# Patient Record
Sex: FEMALE | Race: WHITE | NOT HISPANIC OR LATINO | URBAN - METROPOLITAN AREA
[De-identification: names, ages, dates, MRNs, and addresses within clinical notes are randomized per-mention and may not be internally consistent; named-entity substitution may affect disease eponyms.]

---

## 2017-09-21 ENCOUNTER — INPATIENT (INPATIENT)
Facility: HOSPITAL | Age: 42
LOS: 3 days | Discharge: HOME | End: 2017-09-25
Attending: INTERNAL MEDICINE | Admitting: INTERNAL MEDICINE
Payer: COMMERCIAL

## 2017-09-21 VITALS — WEIGHT: 199.96 LBS | HEIGHT: 69 IN

## 2017-09-21 DIAGNOSIS — Z90.12 ACQUIRED ABSENCE OF LEFT BREAST AND NIPPLE: Chronic | ICD-10-CM

## 2017-09-21 LAB
ALBUMIN SERPL ELPH-MCNC: 3.8 G/DL — SIGNIFICANT CHANGE UP (ref 3.3–5)
ALP SERPL-CCNC: 177 U/L — HIGH (ref 40–120)
ALT FLD-CCNC: 195 U/L — HIGH (ref 12–78)
ANION GAP SERPL CALC-SCNC: 8 MMOL/L — SIGNIFICANT CHANGE UP (ref 5–17)
AST SERPL-CCNC: 186 U/L — HIGH (ref 15–37)
BASOPHILS # BLD AUTO: 0.1 K/UL — SIGNIFICANT CHANGE UP (ref 0–0.2)
BASOPHILS NFR BLD AUTO: 0.8 % — SIGNIFICANT CHANGE UP (ref 0–2)
BILIRUB SERPL-MCNC: 0.3 MG/DL — SIGNIFICANT CHANGE UP (ref 0.2–1.2)
BUN SERPL-MCNC: 14 MG/DL — SIGNIFICANT CHANGE UP (ref 7–23)
CALCIUM SERPL-MCNC: 9.3 MG/DL — SIGNIFICANT CHANGE UP (ref 8.5–10.1)
CHLORIDE SERPL-SCNC: 105 MMOL/L — SIGNIFICANT CHANGE UP (ref 96–108)
CO2 SERPL-SCNC: 26 MMOL/L — SIGNIFICANT CHANGE UP (ref 22–31)
CREAT SERPL-MCNC: 0.84 MG/DL — SIGNIFICANT CHANGE UP (ref 0.5–1.3)
EOSINOPHIL # BLD AUTO: 0.3 K/UL — SIGNIFICANT CHANGE UP (ref 0–0.5)
EOSINOPHIL NFR BLD AUTO: 3.5 % — SIGNIFICANT CHANGE UP (ref 0–6)
GLUCOSE SERPL-MCNC: 102 MG/DL — HIGH (ref 70–99)
HCT VFR BLD CALC: 34.6 % — SIGNIFICANT CHANGE UP (ref 34.5–45)
HGB BLD-MCNC: 12 G/DL — SIGNIFICANT CHANGE UP (ref 11.5–15.5)
LACTATE SERPL-SCNC: 1.7 MMOL/L — SIGNIFICANT CHANGE UP (ref 0.7–2)
LYMPHOCYTES # BLD AUTO: 2.2 K/UL — SIGNIFICANT CHANGE UP (ref 1–3.3)
LYMPHOCYTES # BLD AUTO: 22.8 % — SIGNIFICANT CHANGE UP (ref 13–44)
MCHC RBC-ENTMCNC: 29.3 PG — SIGNIFICANT CHANGE UP (ref 27–34)
MCHC RBC-ENTMCNC: 34.6 GM/DL — SIGNIFICANT CHANGE UP (ref 32–36)
MCV RBC AUTO: 84.6 FL — SIGNIFICANT CHANGE UP (ref 80–100)
MONOCYTES # BLD AUTO: 0.7 K/UL — SIGNIFICANT CHANGE UP (ref 0–0.9)
MONOCYTES NFR BLD AUTO: 6.9 % — SIGNIFICANT CHANGE UP (ref 2–14)
NEUTROPHILS # BLD AUTO: 6.4 K/UL — SIGNIFICANT CHANGE UP (ref 1.8–7.4)
NEUTROPHILS NFR BLD AUTO: 65.9 % — SIGNIFICANT CHANGE UP (ref 43–77)
PLATELET # BLD AUTO: 311 K/UL — SIGNIFICANT CHANGE UP (ref 150–400)
POTASSIUM SERPL-MCNC: 3.7 MMOL/L — SIGNIFICANT CHANGE UP (ref 3.5–5.3)
POTASSIUM SERPL-SCNC: 3.7 MMOL/L — SIGNIFICANT CHANGE UP (ref 3.5–5.3)
PROT SERPL-MCNC: 7.8 GM/DL — SIGNIFICANT CHANGE UP (ref 6–8.3)
RBC # BLD: 4.09 M/UL — SIGNIFICANT CHANGE UP (ref 3.8–5.2)
RBC # FLD: 12.2 % — SIGNIFICANT CHANGE UP (ref 10.3–14.5)
SODIUM SERPL-SCNC: 139 MMOL/L — SIGNIFICANT CHANGE UP (ref 135–145)
WBC # BLD: 9.8 K/UL — SIGNIFICANT CHANGE UP (ref 3.8–10.5)
WBC # FLD AUTO: 9.8 K/UL — SIGNIFICANT CHANGE UP (ref 3.8–10.5)

## 2017-09-21 PROCEDURE — 99285 EMERGENCY DEPT VISIT HI MDM: CPT

## 2017-09-21 PROCEDURE — 76700 US EXAM ABDOM COMPLETE: CPT | Mod: 26

## 2017-09-21 PROCEDURE — 73120 X-RAY EXAM OF HAND: CPT | Mod: 26,RT

## 2017-09-21 RX ORDER — CEFTRIAXONE 500 MG/1
1 INJECTION, POWDER, FOR SOLUTION INTRAMUSCULAR; INTRAVENOUS EVERY 24 HOURS
Qty: 0 | Refills: 0 | Status: DISCONTINUED | OUTPATIENT
Start: 2017-09-22 | End: 2017-09-25

## 2017-09-21 RX ORDER — ACETAMINOPHEN 500 MG
650 TABLET ORAL EVERY 6 HOURS
Qty: 0 | Refills: 0 | Status: DISCONTINUED | OUTPATIENT
Start: 2017-09-21 | End: 2017-09-23

## 2017-09-21 RX ORDER — SODIUM CHLORIDE 9 MG/ML
1000 INJECTION INTRAMUSCULAR; INTRAVENOUS; SUBCUTANEOUS ONCE
Qty: 0 | Refills: 0 | Status: COMPLETED | OUTPATIENT
Start: 2017-09-21 | End: 2017-09-21

## 2017-09-21 RX ORDER — LANOLIN ALCOHOL/MO/W.PET/CERES
3 CREAM (GRAM) TOPICAL AT BEDTIME
Qty: 0 | Refills: 0 | Status: DISCONTINUED | OUTPATIENT
Start: 2017-09-21 | End: 2017-09-25

## 2017-09-21 RX ORDER — LANOLIN ALCOHOL/MO/W.PET/CERES
3 CREAM (GRAM) TOPICAL AT BEDTIME
Qty: 0 | Refills: 0 | Status: DISCONTINUED | OUTPATIENT
Start: 2017-09-21 | End: 2017-09-21

## 2017-09-21 RX ORDER — CEFTRIAXONE 500 MG/1
INJECTION, POWDER, FOR SOLUTION INTRAMUSCULAR; INTRAVENOUS
Qty: 0 | Refills: 0 | Status: DISCONTINUED | OUTPATIENT
Start: 2017-09-21 | End: 2017-09-25

## 2017-09-21 RX ORDER — CHOLECALCIFEROL (VITAMIN D3) 125 MCG
4000 CAPSULE ORAL DAILY
Qty: 0 | Refills: 0 | Status: DISCONTINUED | OUTPATIENT
Start: 2017-09-21 | End: 2017-09-25

## 2017-09-21 RX ORDER — VENLAFAXINE HCL 75 MG
150 CAPSULE, EXT RELEASE 24 HR ORAL AT BEDTIME
Qty: 0 | Refills: 0 | Status: DISCONTINUED | OUTPATIENT
Start: 2017-09-21 | End: 2017-09-25

## 2017-09-21 RX ORDER — PIPERACILLIN AND TAZOBACTAM 4; .5 G/20ML; G/20ML
3.38 INJECTION, POWDER, LYOPHILIZED, FOR SOLUTION INTRAVENOUS ONCE
Qty: 0 | Refills: 0 | Status: COMPLETED | OUTPATIENT
Start: 2017-09-21 | End: 2017-09-21

## 2017-09-21 RX ORDER — LETROZOLE 2.5 MG/1
2.5 TABLET, FILM COATED ORAL DAILY
Qty: 0 | Refills: 0 | Status: DISCONTINUED | OUTPATIENT
Start: 2017-09-21 | End: 2017-09-25

## 2017-09-21 RX ORDER — CEFTRIAXONE 500 MG/1
1 INJECTION, POWDER, FOR SOLUTION INTRAMUSCULAR; INTRAVENOUS ONCE
Qty: 0 | Refills: 0 | Status: COMPLETED | OUTPATIENT
Start: 2017-09-21 | End: 2017-09-21

## 2017-09-21 RX ADMIN — PIPERACILLIN AND TAZOBACTAM 200 GRAM(S): 4; .5 INJECTION, POWDER, LYOPHILIZED, FOR SOLUTION INTRAVENOUS at 21:15

## 2017-09-21 RX ADMIN — CEFTRIAXONE 100 GRAM(S): 500 INJECTION, POWDER, FOR SOLUTION INTRAMUSCULAR; INTRAVENOUS at 23:11

## 2017-09-21 RX ADMIN — Medication 1 TABLET(S): at 21:15

## 2017-09-21 RX ADMIN — Medication 650 MILLIGRAM(S): at 23:55

## 2017-09-21 RX ADMIN — SODIUM CHLORIDE 1000 MILLILITER(S): 9 INJECTION INTRAMUSCULAR; INTRAVENOUS; SUBCUTANEOUS at 21:15

## 2017-09-21 NOTE — ED PROVIDER NOTE - SKIN, MLM
soft tissue edema with erythema and warm to the dorsum of hand with induration over the dorsum of 1st metacarpal, mild lymphangitis

## 2017-09-21 NOTE — H&P ADULT - ASSESSMENT
40 yo female with PMH of breast cancer s/p b/l mastectomy presents to ED with complaint of right dorsal hand cellulitis. Pt states she was bitten by a mosquito and then noted to have erythema and swelling of hand 1 day ago. She went to urgent care yesterday and was given PO clindamycin. She then noted to have increased swelling and streaking up the arm and returnd to urgent care this morning where she received a shot of penicillin IM. She then noted to have worsening of swelling and came to ED for further care. No fevers or chills. She does have a history of cellulitis around right breast after mastectomy in the past. Denies any chest pain, SOB, abd pain, N/V, diarrhea.     *Right dorsum hand cellulitis  - admit to med surg  - failed outpatient management  - given zosyn in ED and PO bactrim  - IV abx - ceftriaxone and bactrim (pt with allergy to vanco)  - f/u cultures  - ID consult    *Elevated LFTs  - check RUQ sono  - hepatitis panel    *DVT prophylaxis  - low risk, encourage ambulation 40 yo female with PMH of breast cancer s/p b/l mastectomy presents to ED with complaint of right dorsal hand cellulitis. Pt states she was bitten by a mosquito and then noted to have erythema and swelling of hand 1 day ago. She went to urgent care yesterday and was given PO clindamycin. She then noted to have increased swelling and streaking up the arm and returnd to urgent care this morning where she received a shot of penicillin IM. She then noted to have worsening of swelling and came to ED for further care. No fevers or chills. She does have a history of cellulitis around right breast after mastectomy in the past. Denies any chest pain, SOB, abd pain, N/V, diarrhea.     *Right dorsum hand cellulitis  - admit to med surg  - failed outpatient management  - given zosyn in ED and PO bactrim  - IV abx - ceftriaxone and doxycycline (pt with allergy to vanco)  - f/u cultures  - ID consult    *Elevated LFTs  - check RUQ sono  - hepatitis panel    *DVT prophylaxis  - low risk, encourage ambulation

## 2017-09-21 NOTE — ED ADULT NURSE NOTE - OBJECTIVE STATEMENT
Patient arrived to ED c/o right hand pain, redness. Patient seen at urgent care, referred to ED for cellulitis of right hand. Per patient redness got worse today, including drainage. Urgent care Patient arrived to ED c/o right hand pain, redness. Patient seen at urgent care, referred to ED for cellulitis of right hand. Per patient redness got worse today, including drainage. Urgent care gave penicillin, 1 shot and prescribed doxyclicline, 2nd dose. Hx of breast ca, pink band to left arm. Area edematous, warm to touch, not currently draining.

## 2017-09-21 NOTE — ED PROVIDER NOTE - OBJECTIVE STATEMENT
40 yo F with history of breast cancer presents to ED for evaluation of infection to the right hand. patient reports redness to hand yesterday, visited urgent care center and given an IM penicillin injection, prescription for clindamycin. patient states since yesterday infection worsening by redness passing demarcated area as well as edema to the hand. no fever or chills. denies bite by animal, thinks infection started after mosquito bite.

## 2017-09-21 NOTE — H&P ADULT - NSHPPHYSICALEXAM_GEN_ALL_CORE
Vital Signs Last 24 Hrs  T(C): 36.9 (21 Sep 2017 20:19), Max: 36.9 (21 Sep 2017 20:19)  T(F): 98.4 (21 Sep 2017 20:19), Max: 98.4 (21 Sep 2017 20:19)  HR: 100 (21 Sep 2017 20:19) (100 - 100)  BP: 113/52 (21 Sep 2017 20:19) (113/52 - 113/52)  BP(mean): 67 (21 Sep 2017 20:19) (67 - 67)  RR: 18 (21 Sep 2017 20:19) (18 - 18)  SpO2: 100% (21 Sep 2017 20:19) (100% - 100%)

## 2017-09-21 NOTE — ED STATDOCS - FAMILY HISTORY
Mother  Still living? Unknown  Family history of asthma, Age at diagnosis: Age Unknown     Father  Still living? Unknown  Family history of diabetes mellitus, Age at diagnosis: Age Unknown

## 2017-09-21 NOTE — H&P ADULT - HISTORY OF PRESENT ILLNESS
40 yo female with PMH of breast cancer s/p b/l mastectomy presents to ED with complaint of right dorsal hand cellulitis. Pt states she was bitten by a mosquito and then noted to have erythema and swelling of hand 1 day ago. She went to urgent care yesterday and was given PO clindamycin. She then noted to have increased swelling and streaking up the arm and returnd to urgent care this morning where she received a shot of penicillin IM. She then noted to have worsening of swelling and came to ED for further care. No fevers or chills. She does have a history of cellulitis around right breast after mastectomy in the past. Denies any chest pain, SOB, abd pain, N/V, diarrhea.     In ED pt given PO bactrim and IV zosyn.

## 2017-09-21 NOTE — ED STATDOCS - PROGRESS NOTE DETAILS
Sonny Mora on behalf of Attending Dr. Hernandez. 40 y/o F with PMHx of breast CA s/p mastectomy presents to the ED with right arm cellulitis after being seen at [facility]gent[facility]. Pt had multiple bites from mosquitos on both arms and developed redness, pain and warmth on right hand. Dr. Patel, PMD. Right hand with significant redness with lymphangitis going up right arm found on exam. Pt will be sent to the Main ED for further evaluation.

## 2017-09-21 NOTE — H&P ADULT - ERYTHEMA LOCATION
right dorsum hand with erythem and edema, warm to touch, area of demarcation noted, small area of confluence around thumb/hand R

## 2017-09-22 LAB
ALBUMIN SERPL ELPH-MCNC: 3.2 G/DL — LOW (ref 3.3–5)
ALP SERPL-CCNC: 166 U/L — HIGH (ref 40–120)
ALT FLD-CCNC: 185 U/L — HIGH (ref 12–78)
ANION GAP SERPL CALC-SCNC: 7 MMOL/L — SIGNIFICANT CHANGE UP (ref 5–17)
AST SERPL-CCNC: 154 U/L — HIGH (ref 15–37)
BASOPHILS # BLD AUTO: 0.1 K/UL — SIGNIFICANT CHANGE UP (ref 0–0.2)
BASOPHILS NFR BLD AUTO: 0.9 % — SIGNIFICANT CHANGE UP (ref 0–2)
BILIRUB SERPL-MCNC: 0.3 MG/DL — SIGNIFICANT CHANGE UP (ref 0.2–1.2)
BUN SERPL-MCNC: 11 MG/DL — SIGNIFICANT CHANGE UP (ref 7–23)
CALCIUM SERPL-MCNC: 8.9 MG/DL — SIGNIFICANT CHANGE UP (ref 8.5–10.1)
CHLORIDE SERPL-SCNC: 109 MMOL/L — HIGH (ref 96–108)
CO2 SERPL-SCNC: 24 MMOL/L — SIGNIFICANT CHANGE UP (ref 22–31)
CREAT SERPL-MCNC: 0.7 MG/DL — SIGNIFICANT CHANGE UP (ref 0.5–1.3)
EOSINOPHIL # BLD AUTO: 0.3 K/UL — SIGNIFICANT CHANGE UP (ref 0–0.5)
EOSINOPHIL NFR BLD AUTO: 4.1 % — SIGNIFICANT CHANGE UP (ref 0–6)
GLUCOSE SERPL-MCNC: 93 MG/DL — SIGNIFICANT CHANGE UP (ref 70–99)
HAV IGM SER-ACNC: SIGNIFICANT CHANGE UP
HBV CORE IGM SER-ACNC: SIGNIFICANT CHANGE UP
HBV SURFACE AG SER-ACNC: SIGNIFICANT CHANGE UP
HCT VFR BLD CALC: 32.3 % — LOW (ref 34.5–45)
HCV AB S/CO SERPL IA: 0.1 S/CO — SIGNIFICANT CHANGE UP
HCV AB SERPL-IMP: SIGNIFICANT CHANGE UP
HGB BLD-MCNC: 11.2 G/DL — LOW (ref 11.5–15.5)
LYMPHOCYTES # BLD AUTO: 2 K/UL — SIGNIFICANT CHANGE UP (ref 1–3.3)
LYMPHOCYTES # BLD AUTO: 27.8 % — SIGNIFICANT CHANGE UP (ref 13–44)
MCHC RBC-ENTMCNC: 29.9 PG — SIGNIFICANT CHANGE UP (ref 27–34)
MCHC RBC-ENTMCNC: 34.6 GM/DL — SIGNIFICANT CHANGE UP (ref 32–36)
MCV RBC AUTO: 86.5 FL — SIGNIFICANT CHANGE UP (ref 80–100)
MONOCYTES # BLD AUTO: 0.5 K/UL — SIGNIFICANT CHANGE UP (ref 0–0.9)
MONOCYTES NFR BLD AUTO: 7.2 % — SIGNIFICANT CHANGE UP (ref 2–14)
NEUTROPHILS # BLD AUTO: 4.4 K/UL — SIGNIFICANT CHANGE UP (ref 1.8–7.4)
NEUTROPHILS NFR BLD AUTO: 60 % — SIGNIFICANT CHANGE UP (ref 43–77)
PLATELET # BLD AUTO: 234 K/UL — SIGNIFICANT CHANGE UP (ref 150–400)
POTASSIUM SERPL-MCNC: 3.9 MMOL/L — SIGNIFICANT CHANGE UP (ref 3.5–5.3)
POTASSIUM SERPL-SCNC: 3.9 MMOL/L — SIGNIFICANT CHANGE UP (ref 3.5–5.3)
PROT SERPL-MCNC: 6.9 GM/DL — SIGNIFICANT CHANGE UP (ref 6–8.3)
RBC # BLD: 3.73 M/UL — LOW (ref 3.8–5.2)
RBC # FLD: 12.9 % — SIGNIFICANT CHANGE UP (ref 10.3–14.5)
SODIUM SERPL-SCNC: 140 MMOL/L — SIGNIFICANT CHANGE UP (ref 135–145)
WBC # BLD: 7.3 K/UL — SIGNIFICANT CHANGE UP (ref 3.8–10.5)
WBC # FLD AUTO: 7.3 K/UL — SIGNIFICANT CHANGE UP (ref 3.8–10.5)

## 2017-09-22 RX ORDER — MUPIROCIN 20 MG/G
1 OINTMENT TOPICAL
Qty: 0 | Refills: 0 | Status: DISCONTINUED | OUTPATIENT
Start: 2017-09-22 | End: 2017-09-23

## 2017-09-22 RX ORDER — DAPTOMYCIN 500 MG/10ML
360 INJECTION, POWDER, LYOPHILIZED, FOR SOLUTION INTRAVENOUS EVERY 24 HOURS
Qty: 0 | Refills: 0 | Status: DISCONTINUED | OUTPATIENT
Start: 2017-09-22 | End: 2017-09-25

## 2017-09-22 RX ADMIN — Medication 110 MILLIGRAM(S): at 05:01

## 2017-09-22 RX ADMIN — CEFTRIAXONE 100 GRAM(S): 500 INJECTION, POWDER, FOR SOLUTION INTRAMUSCULAR; INTRAVENOUS at 22:03

## 2017-09-22 RX ADMIN — Medication 650 MILLIGRAM(S): at 16:34

## 2017-09-22 RX ADMIN — DAPTOMYCIN 114.4 MILLIGRAM(S): 500 INJECTION, POWDER, LYOPHILIZED, FOR SOLUTION INTRAVENOUS at 12:15

## 2017-09-22 RX ADMIN — MUPIROCIN 1 APPLICATION(S): 20 OINTMENT TOPICAL at 18:50

## 2017-09-22 RX ADMIN — LETROZOLE 2.5 MILLIGRAM(S): 2.5 TABLET, FILM COATED ORAL at 12:15

## 2017-09-22 RX ADMIN — Medication 110 MILLIGRAM(S): at 00:26

## 2017-09-22 RX ADMIN — Medication 150 MILLIGRAM(S): at 22:03

## 2017-09-22 RX ADMIN — Medication 4000 UNIT(S): at 12:15

## 2017-09-22 NOTE — PROGRESS NOTE ADULT - SUBJECTIVE AND OBJECTIVE BOX
CC: hand infection    HPI: 40 yo female with PMH of breast cancer s/p b/l mastectomy presents to ED with complaint of right dorsal hand cellulitis. Pt states she was bitten by a mosquito and then noted to have erythema and swelling of hand X 1 day not improved on Clinda given by Urgent care X 1 day. Symptoms worsened and patient was given IM PCN at Urgent Care 2nd visit and referred to ED where she was given IV Zosyn and PO Bactrim (allergy to Vanc). She does have a history of cellulitis around right breast after mastectomy in the past.    Seen by ID, on IV Rocephin and IV Dapto --> pain decreasing. Seen ambulating in hallways, denies fever.       Vital Signs Last 24 Hrs  T(C): 36.7 (22 Sep 2017 04:56), Max: 36.9 (21 Sep 2017 20:19)  T(F): 98.1 (22 Sep 2017 04:56), Max: 98.4 (21 Sep 2017 20:19)  HR: 81 (22 Sep 2017 04:56) (81 - 100)  BP: 107/62 (22 Sep 2017 04:56) (107/62 - 123/70)  BP(mean): 67 (21 Sep 2017 20:19) (67 - 67)  RR: 16 (22 Sep 2017 04:56) (16 - 18)  SpO2: 98% (22 Sep 2017 04:56) (98% - 100%)    PHYSICAL EXAM:  Constitutional: NAD, awake and alert, well-developed  HEENT: PERR, EOMI, Normal Hearing, MMM  Neck: Soft and supple, No LAD, No JVD  Respiratory: Breath sounds are clear bilaterally, No wheezing, rales or rhonchi  Cardiovascular: S1 and S2, regular rate and rhythm, no Murmurs, gallops or rubs  Gastrointestinal: Bowel Sounds present, soft, nontender, nondistended, no guarding, no rebound  Extremities: No peripheral edema  Vascular: 2+ peripheral pulses  Neurological: A/O x 3, no focal deficits  Musculoskeletal: 5/5 strength b/l upper and lower extremities  Skin: Notable swelling and erthyma surroundin base of right thumb and punctate wound. Not extending beyond marker demarcation.     MEDICATIONS  (STANDING):  cefTRIAXone   IVPB      cefTRIAXone   IVPB 1 Gram(s) IV Intermittent every 24 hours  venlafaxine XR. 150 milliGRAM(s) Oral at bedtime  letrozole 2.5 milliGRAM(s) Oral daily  cholecalciferol 4000 Unit(s) Oral daily  DAPTOmycin IVPB 360 milliGRAM(s) IV Intermittent every 24 hours      LABS: All Labs Reviewed:                        11.2   7.3   )-----------( 234      ( 22 Sep 2017 05:30 )             32.3     09-22    140  |  109<H>  |  11  ----------------------------<  93  3.9   |  24  |  0.70    Ca    8.9      22 Sep 2017 05:30    TPro  6.9  /  Alb  3.2<L>  /  TBili  0.3  /  DBili  x   /  AST  154<H>  /  ALT  185<H>  /  AlkPhos  166<H>  09-22  Blood Culture: pending     US Abdomen Complete (09.21.17 @ 22:51) >  IMPRESSION:  Hepatic steatosis. No gallstones or evidence of acute cholecystitis.

## 2017-09-22 NOTE — PROGRESS NOTE ADULT - ASSESSMENT
42 yo female with PMH of breast cancer s/p b/l mastectomy presents to ED with complaint of right dorsal hand cellulitis.    *Right dorsum hand cellulitis  - appears to be improving on IV abx --> cont IV Rocephin, Dapto added for MRSA coverage (allergic to Vanc).   - appreciate ID.   - f/u cultures.   - no extension to fingers, movement mildly limited by swelling otherwise does not need Hand Consult. Afebrile.     *Elevated LFTs - likely medication induced, on Oral Letrozole and Effexor.   - known hx of mildly elevated LFTs.   - RUQ Sono with hepatic steatosis, no cholescytitis. Asymptomatic. Reviewed diet and weight loss.   - hepatitis panel    # [place] [facility] [place], told in [place].   - cont daily Letrozole.     *DVT prophylaxis  - low risk, encourage ambulation    Dispo: remain inpatient, tentative dc home tomorrow pending clinical improvement.   Total time > 40 mins.

## 2017-09-22 NOTE — CONSULT NOTE ADULT - SUBJECTIVE AND OBJECTIVE BOX
HPI:  42 yo female with PMH of breast cancer s/p b/l mastectomy now admitted on 9/21 for evaluation of right hand ulcer over first mcp joint with surrounding redness and swelling with streaking up the wrist and lower arm; she notes being bit by a mosquito prior to this; she also has on left hand another bite that has ulcerative appearance that she states happens after bug bites; she was seen in [facility], given clindamycin then returned and given shot of penicillin but symptoms progressed and she came to ED.           PMH: as above  PSH: as above  Meds: per reconcilation sheet, noted below  MEDICATIONS  (STANDING):  cefTRIAXone   IVPB      cefTRIAXone   IVPB 1 Gram(s) IV Intermittent every 24 hours  venlafaxine XR. 150 milliGRAM(s) Oral at bedtime  letrozole 2.5 milliGRAM(s) Oral daily  cholecalciferol 4000 Unit(s) Oral daily  DAPTOmycin IVPB 360 milliGRAM(s) IV Intermittent every 24 hours    MEDICATIONS  (PRN):  acetaminophen   Tablet 650 milliGRAM(s) Oral every 6 hours PRN For Temp greater than 38 C (100.4 F)  melatonin 3 milliGRAM(s) Oral at bedtime PRN Insomnia    Allergies    Erythromycin Base (Unknown)  strawberry (Unknown)  vancomycin (Hives)    Intolerances      Social: no smoking, no alcohol, no illegal drugs; no recent travel, no exposure to TB  FAMILY HISTORY:  Family history of diabetes mellitus (Father)  Family history of asthma (Mother)    ROS: the patient has no fever, no chills, no HA, no dizziness, no sore throat, no blurry vision, no CP, no palpitations, no SOB, no cough, no abdominal pain, no diarrhea, no N/V, no dysuria, no leg pain, no claudication, no rash,  no rectal pain or bleeding, no night sweats  Vital Signs Last 24 Hrs  T(C): 36.7 (22 Sep 2017 04:56), Max: 36.9 (21 Sep 2017 20:19)  T(F): 98.1 (22 Sep 2017 04:56), Max: 98.4 (21 Sep 2017 20:19)  HR: 81 (22 Sep 2017 04:56) (81 - 100)  BP: 107/62 (22 Sep 2017 04:56) (107/62 - 123/70)  BP(mean): 67 (21 Sep 2017 20:19) (67 - 67)  RR: 16 (22 Sep 2017 04:56) (16 - 18)  SpO2: 98% (22 Sep 2017 04:56) (98% - 100%)  Daily Height in cm: 175.26 (21 Sep 2017 20:10)    Daily   Constitutional: nontoxic  HEENT: NC/AT, EOMI, PERRLA  Neck: supple  Respiratory: clear, no r/r/w  Cardiovascular: S1S2 regular, no murmurs  Abdomen: soft, not tender, not distended, positive BS  Genitourinary: deferred  Rectal: deferred  Musculoskeletal: right hand with ulcer over first mcp, surrounding erythema and edema, tender to touch, decreased range of motion, similar ulcer on left hand but less erythema  Neurological: AxOx3, moving all extremities, no focal deficits  Skin: no rashes                          11.2   7.3   )-----------( 234      ( 22 Sep 2017 05:30 )             32.3     09-22    140  |  109<H>  |  11  ----------------------------<  93  3.9   |  24  |  0.70    Ca    8.9      22 Sep 2017 05:30    TPro  6.9  /  Alb  3.2<L>  /  TBili  0.3  /  DBili  x   /  AST  154<H>  /  ALT  185<H>  /  AlkPhos  166<H>  09-22     LIVER FUNCTIONS - ( 22 Sep 2017 05:30 )  Alb: 3.2 g/dL / Pro: 6.9 gm/dL / ALK PHOS: 166 U/L / ALT: 185 U/L / AST: 154 U/L / GGT: x                 Radiology:< from: US Abdomen Complete (09.21.17 @ 22:51) >  EXAM:  US COMPLETE ABDOMEN SONOGRAM                            PROCEDURE DATE:  09/21/2017          INTERPRETATION:  HISTORY: Elevated LFTs. Right upper quadrant pain.    TECHNIQUE: Abdominal sonogram was performed.     COMPARISON: There is no similar prior study for comparison.    FINDINGS:   The study is suboptimal due to extensive bowel gas shadowing.    The liver is borderline enlarged and demonstrates diffuse increased   echogenicity compatible with hepatic steatosis. No focal liver lesionis   seen on this modality. There is no intra or extrahepatic biliary   dilatation. The common bile duct measures 5 mm. The gallbladder is fluid   filled without evidence of calculus or wall thickening. There is no   pericholecystic fluid. Sonographic Cerrato's sign was not elicited.    The spleen is upper limits of normal in size measuring 15.3 cm.   Echotexture is unremarkable. The pancreas is not well-visualized. There   is no ascites.    The upper abdominal aorta and inferior vena cava are within normal limits.    The right kidney measures 10.8 cm and the left kidney measures 10.8 cm in   the sagittal plane. There is no hydronephrosis or shadowing calculus.    IMPRESSION:  Hepatic steatosis. No gallstones or evidence of acute cholecystitis.        < end of copied text >      Advanced directive addressed: full resuscitation

## 2017-09-22 NOTE — CONSULT NOTE ADULT - ASSESSMENT
42 yo female with PMH of breast cancer s/p b/l mastectomy now admitted on 9/21 for evaluation of right hand ulcer over first mcp joint with surrounding redness and swelling with streaking up the wrist and lower arm; she notes being bit by a mosquito prior to this; she also has on left hand another bite that has ulcerative appearance that she states happens after bug bites; she was seen in [facility] [place], given clindamycin then returned and given shot of penicillin but symptoms progressed and she came to ED.   1. Right hand cellulitis with ulcer  - follow up cultures   - elevate hand  - serial cbc and monitor temperature   - agree with ceftriaxone as ordered  - will start daptomycin for resistant bacteria as patient cannot tolerate vancomycin  - iv hydration and supportive care   Will follow

## 2017-09-23 RX ORDER — DOCUSATE SODIUM 100 MG
100 CAPSULE ORAL
Qty: 0 | Refills: 0 | Status: DISCONTINUED | OUTPATIENT
Start: 2017-09-23 | End: 2017-09-25

## 2017-09-23 RX ORDER — ACETAMINOPHEN 500 MG
650 TABLET ORAL ONCE
Qty: 0 | Refills: 0 | Status: COMPLETED | OUTPATIENT
Start: 2017-09-23 | End: 2017-09-23

## 2017-09-23 RX ORDER — ONDANSETRON 8 MG/1
4 TABLET, FILM COATED ORAL EVERY 4 HOURS
Qty: 0 | Refills: 0 | Status: DISCONTINUED | OUTPATIENT
Start: 2017-09-23 | End: 2017-09-25

## 2017-09-23 RX ORDER — IBUPROFEN 200 MG
600 TABLET ORAL EVERY 6 HOURS
Qty: 0 | Refills: 0 | Status: DISCONTINUED | OUTPATIENT
Start: 2017-09-23 | End: 2017-09-25

## 2017-09-23 RX ADMIN — DAPTOMYCIN 114.4 MILLIGRAM(S): 500 INJECTION, POWDER, LYOPHILIZED, FOR SOLUTION INTRAVENOUS at 10:24

## 2017-09-23 RX ADMIN — Medication 600 MILLIGRAM(S): at 22:29

## 2017-09-23 RX ADMIN — Medication 150 MILLIGRAM(S): at 21:59

## 2017-09-23 RX ADMIN — Medication 600 MILLIGRAM(S): at 21:59

## 2017-09-23 RX ADMIN — LETROZOLE 2.5 MILLIGRAM(S): 2.5 TABLET, FILM COATED ORAL at 11:41

## 2017-09-23 RX ADMIN — Medication 600 MILLIGRAM(S): at 15:54

## 2017-09-23 RX ADMIN — Medication 650 MILLIGRAM(S): at 01:01

## 2017-09-23 RX ADMIN — Medication 650 MILLIGRAM(S): at 23:14

## 2017-09-23 RX ADMIN — Medication 3 MILLIGRAM(S): at 01:21

## 2017-09-23 RX ADMIN — Medication 3 MILLIGRAM(S): at 23:15

## 2017-09-23 RX ADMIN — MUPIROCIN 1 APPLICATION(S): 20 OINTMENT TOPICAL at 05:07

## 2017-09-23 RX ADMIN — CEFTRIAXONE 100 GRAM(S): 500 INJECTION, POWDER, FOR SOLUTION INTRAMUSCULAR; INTRAVENOUS at 21:59

## 2017-09-23 RX ADMIN — Medication 650 MILLIGRAM(S): at 23:44

## 2017-09-23 RX ADMIN — Medication 4000 UNIT(S): at 11:41

## 2017-09-23 NOTE — CONSULT NOTE ADULT - ASSESSMENT
A/P: 41F w/ R hand abscess/cellulitis   Pain control  DVT ppx - encourage ambulation  WBAT  Keep dressing CDI  Maintain penrose drain  Cont IV abx per ID  Cont med management per primary team  Drain to be removed Monday 9/25 by Ortho  Dr. Ahmadi present at bedside and agrees with above plan  All pt questions answered  FU with Dr. hAmadi on outpatient basis 2 days following DC from hospital  Dc planning

## 2017-09-23 NOTE — PROGRESS NOTE ADULT - ASSESSMENT
40 yo female with PMH of breast cancer s/p b/l mastectomy presents to ED with complaint of right dorsal hand cellulitis.    *Right dorsum hand cellulitis  - arm extension improved but now with induration on hand, possibly to benefit from I&D.   - will consult Ortho Hand for further recs.   - cont IV Rocephin and Dapto day #2. Afebrile, no leukocytosis.   - f/u cultures.   - ibuprofen ATC for swelling and pain.     *Elevated LFTs - likely medication induced, on Oral Letrozole and Effexor.   - known hx of mildly elevated LFTs.   - RUQ Sono with hepatic steatosis, no cholescytitis. Asymptomatic. Reviewed diet and weight loss.   - hepatitis panel    # Hx of Breast Cancer - [place] [facility] [place] not [place], told in remission.   - cont daily Letrozole.     *DVT prophylaxis  - low risk, encourage ambulation    Dispo: remain inpatient, tentative dc home tomorrow pending clinical improvement. Consult Ortho hand.   Total time > 40 mins.

## 2017-09-23 NOTE — CONSULT NOTE ADULT - SUBJECTIVE AND OBJECTIVE BOX
40yo RHD female c/o R hand/wrist pain and cellulitis/infection. This began on Thursday of this week. Pt states infection was localized around a recent mosquito bite. Denies trauma or associated injury. Denies nausea, vomiting, chest pain, shortness of breath, abdominal pain or fever. No new complaints. No acute motor or sensory changes are reported. Orthopedic consultation was requested by the Medical service and Pt was seen for Dr. Ahmadi.    PAST MEDICAL & SURGICAL HISTORY:  Breast cancer  H/O mastectomy, left  Chemo    MEDICATIONS  (STANDING):  cefTRIAXone   IVPB      cefTRIAXone   IVPB 1 Gram(s) IV Intermittent every 24 hours  venlafaxine XR. 150 milliGRAM(s) Oral at bedtime  letrozole 2.5 milliGRAM(s) Oral daily  cholecalciferol 4000 Unit(s) Oral daily  DAPTOmycin IVPB 360 milliGRAM(s) IV Intermittent every 24 hours    MEDICATIONS  (PRN):  melatonin 3 milliGRAM(s) Oral at bedtime PRN Insomnia  ondansetron Injectable 4 milliGRAM(s) IV Push every 4 hours PRN Nausea and/or Vomiting  docusate sodium 100 milliGRAM(s) Oral two times a day PRN Constipation  ibuprofen  Tablet 600 milliGRAM(s) Oral every 6 hours PRN Moderate Pain    Vital Signs Last 24 Hrs  T(C): 37.1 (23 Sep 2017 12:05), Max: 37.8 (23 Sep 2017 05:12)  T(F): 98.7 (23 Sep 2017 12:05), Max: 100 (23 Sep 2017 05:12)  HR: 93 (23 Sep 2017 12:05) (85 - 93)  BP: 131/74 (23 Sep 2017 12:05) (127/58 - 131/74)  RR: 18 (23 Sep 2017 12:05) (16 - 18)  SpO2: 98% (23 Sep 2017 12:05) (98% - 100%)    Labs:                        11.2   7.3   )-----------( 234      ( 22 Sep 2017 05:30 )             32.3     09-22    140  |  109<H>  |  11  ----------------------------<  93  3.9   |  24  |  0.70    Ca    8.9      22 Sep 2017 05:30    TPro  6.9  /  Alb  3.2<L>  /  TBili  0.3  /  DBili  x   /  AST  154<H>  /  ALT  185<H>  /  AlkPhos  166<H>  09-22      Physical exam:   Gen: NAD Alert Awake, Follows commands. Nonseptic appearing.    R Hand:  There is erythema of radial aspect of the right had with a localized infectious process and abscess near the CMC joint of thumb  There is mild global tenderness of the affected area. Fluctuance noted. No drainage currently but was reported   No proximal streaking or spreading is noted.   Compartments are soft.   Sensation to light touch is intact of digits distally.   Neurologically without focal deficit and strength is symmetric bilaterally.   Motion is mildly limited as a result of pain.  No focal motor weaknesses are appreciated.   2+ Radial DP **pulse.   Capillary refill brisk less than 2 seconds.   No cyanosis.    Imaging:  R Hand/Wrist: No acute fracture; no signs bony infection     Procedure:  Hand prepped with betadine  Sterile field constructed  Local 1% lidocaine with epi injected  Bedside I+D with sterile technique performed  Penrose drain inserted  Sterile dressing applied  Pt NVI post-procedure  No complications

## 2017-09-23 NOTE — PROGRESS NOTE ADULT - SUBJECTIVE AND OBJECTIVE BOX
CC: hand infection    HPI: 42 yo female with PMH of breast cancer s/p b/l mastectomy presents to ED with complaint of right dorsal hand cellulitis. Pt states she was bitten by a mosquito and then noted to have erythema and swelling of hand X 1 day not improved on Clinda given by Urgent care X 1 day. Symptoms worsened and patient was given IM PCN at Urgent Care 2nd visit and referred to ED where she was given IV Zosyn and PO Bactrim (allergy to Vanc). She does have a history of cellulitis around right breast after mastectomy in the past.    9/23/17: Arm erythema resolved however right hand with pain / redness and now pus. Inquires about I&D. Afebrile.  ROS: neg unless stated above.     Vital Signs Last 24 Hrs  T(C): 37.1 (23 Sep 2017 12:05), Max: 37.8 (23 Sep 2017 05:12)  T(F): 98.7 (23 Sep 2017 12:05), Max: 100 (23 Sep 2017 05:12)  HR: 93 (23 Sep 2017 12:05) (85 - 97)  BP: 131/74 (23 Sep 2017 12:05) (127/58 - 136/82)  BP(mean): --  RR: 18 (23 Sep 2017 12:05) (16 - 18)  SpO2: 98% (23 Sep 2017 12:05) (97% - 100%)    PHYSICAL EXAM:  Constitutional: NAD, awake and alert, well-developed female.   HEENT: PERR, EOMI, Normal Hearing, MMM  Neck: Soft and supple, No LAD, No JVD  Respiratory: Breath sounds are clear bilaterally, No wheezing, rales or rhonchi  Cardiovascular: S1 and S2, regular rate and rhythm, no Murmurs, gallops or rubs  Gastrointestinal: Bowel Sounds present, soft, nontender, nondistended, no guarding, no rebound  Extremities: No peripheral edema  Vascular: 2+ peripheral pulses  Neurological: A/O x 3, no focal deficits  Musculoskeletal: 5/5 strength b/l upper and lower extremities  Skin: Notable swelling and erthyma surrounding base of right thumb and punctate wound now draining white pus. Receeding arm erythema.    MEDICATIONS  (STANDING):  cefTRIAXone   IVPB      cefTRIAXone   IVPB 1 Gram(s) IV Intermittent every 24 hours  venlafaxine XR. 150 milliGRAM(s) Oral at bedtime  letrozole 2.5 milliGRAM(s) Oral daily  cholecalciferol 4000 Unit(s) Oral daily  DAPTOmycin IVPB 360 milliGRAM(s) IV Intermittent every 24 hours  mupirocin 2% Ointment 1 Application(s) Topical two times a day    LABS: All Labs Reviewed:                        11.2   7.3   )-----------( 234      ( 22 Sep 2017 05:30 )             32.3   09-22    140  |  109<H>  |  11  ----------------------------<  93  3.9   |  24  |  0.70    Ca    8.9      22 Sep 2017 05:30    TPro  6.9  /  Alb  3.2<L>  /  TBili  0.3  /  DBili  x   /  AST  154<H>  /  ALT  185<H>  /  AlkPhos  166<H>  09-22  Blood Culture: pending     US Abdomen Complete (09.21.17 @ 22:51) >  IMPRESSION:  Hepatic steatosis. No gallstones or evidence of acute cholecystitis.

## 2017-09-24 LAB
ANION GAP SERPL CALC-SCNC: 8 MMOL/L — SIGNIFICANT CHANGE UP (ref 5–17)
BUN SERPL-MCNC: 13 MG/DL — SIGNIFICANT CHANGE UP (ref 7–23)
CALCIUM SERPL-MCNC: 9.3 MG/DL — SIGNIFICANT CHANGE UP (ref 8.5–10.1)
CHLORIDE SERPL-SCNC: 105 MMOL/L — SIGNIFICANT CHANGE UP (ref 96–108)
CO2 SERPL-SCNC: 25 MMOL/L — SIGNIFICANT CHANGE UP (ref 22–31)
CREAT SERPL-MCNC: 0.78 MG/DL — SIGNIFICANT CHANGE UP (ref 0.5–1.3)
GLUCOSE SERPL-MCNC: 122 MG/DL — HIGH (ref 70–99)
HCT VFR BLD CALC: 37 % — SIGNIFICANT CHANGE UP (ref 34.5–45)
HGB BLD-MCNC: 12.3 G/DL — SIGNIFICANT CHANGE UP (ref 11.5–15.5)
MCHC RBC-ENTMCNC: 28.6 PG — SIGNIFICANT CHANGE UP (ref 27–34)
MCHC RBC-ENTMCNC: 33.2 GM/DL — SIGNIFICANT CHANGE UP (ref 32–36)
MCV RBC AUTO: 86.3 FL — SIGNIFICANT CHANGE UP (ref 80–100)
PLATELET # BLD AUTO: 306 K/UL — SIGNIFICANT CHANGE UP (ref 150–400)
POTASSIUM SERPL-MCNC: 4 MMOL/L — SIGNIFICANT CHANGE UP (ref 3.5–5.3)
POTASSIUM SERPL-SCNC: 4 MMOL/L — SIGNIFICANT CHANGE UP (ref 3.5–5.3)
RBC # BLD: 4.29 M/UL — SIGNIFICANT CHANGE UP (ref 3.8–5.2)
RBC # FLD: 12.4 % — SIGNIFICANT CHANGE UP (ref 10.3–14.5)
SODIUM SERPL-SCNC: 138 MMOL/L — SIGNIFICANT CHANGE UP (ref 135–145)
WBC # BLD: 5.9 K/UL — SIGNIFICANT CHANGE UP (ref 3.8–10.5)
WBC # FLD AUTO: 5.9 K/UL — SIGNIFICANT CHANGE UP (ref 3.8–10.5)

## 2017-09-24 RX ORDER — OXYCODONE AND ACETAMINOPHEN 5; 325 MG/1; MG/1
1 TABLET ORAL EVERY 4 HOURS
Qty: 0 | Refills: 0 | Status: DISCONTINUED | OUTPATIENT
Start: 2017-09-24 | End: 2017-09-25

## 2017-09-24 RX ORDER — IBUPROFEN 200 MG
1 TABLET ORAL
Qty: 0 | Refills: 0 | DISCHARGE
Start: 2017-09-24

## 2017-09-24 RX ADMIN — Medication 150 MILLIGRAM(S): at 21:34

## 2017-09-24 RX ADMIN — CEFTRIAXONE 100 GRAM(S): 500 INJECTION, POWDER, FOR SOLUTION INTRAMUSCULAR; INTRAVENOUS at 21:36

## 2017-09-24 RX ADMIN — LETROZOLE 2.5 MILLIGRAM(S): 2.5 TABLET, FILM COATED ORAL at 11:37

## 2017-09-24 RX ADMIN — Medication 600 MILLIGRAM(S): at 05:58

## 2017-09-24 RX ADMIN — ONDANSETRON 4 MILLIGRAM(S): 8 TABLET, FILM COATED ORAL at 05:28

## 2017-09-24 RX ADMIN — Medication 600 MILLIGRAM(S): at 17:27

## 2017-09-24 RX ADMIN — Medication 600 MILLIGRAM(S): at 05:28

## 2017-09-24 RX ADMIN — Medication 600 MILLIGRAM(S): at 16:57

## 2017-09-24 RX ADMIN — Medication 4000 UNIT(S): at 11:37

## 2017-09-24 RX ADMIN — DAPTOMYCIN 114.4 MILLIGRAM(S): 500 INJECTION, POWDER, LYOPHILIZED, FOR SOLUTION INTRAVENOUS at 08:34

## 2017-09-24 NOTE — PROGRESS NOTE ADULT - SUBJECTIVE AND OBJECTIVE BOX
Pt S&E. Pain controlled. No acute events overnight    Vital Signs Last 24 Hrs  T(C): 36.4 (09-24-17 @ 05:39), Max: 37.1 (09-23-17 @ 12:05)  T(F): 97.5 (09-24-17 @ 05:39), Max: 98.7 (09-23-17 @ 12:05)  HR: 78 (09-24-17 @ 05:39) (78 - 93)  BP: 141/76 (09-24-17 @ 05:39) (120/62 - 141/76)  BP(mean): --  RR: 18 (09-24-17 @ 05:39) (18 - 18)  SpO2: 97% (09-24-17 @ 05:39) (95% - 98%)    Gen: NAD  RUE:  Dsg C/D/I  SILT C5-T1  +AIN/PIN/radial/ulnar/median/musc  +radial pulse  soft compartments, - calf ttp

## 2017-09-24 NOTE — PROGRESS NOTE ADULT - ASSESSMENT
42 yo female with PMH of breast cancer s/p b/l mastectomy presents to ED with complaint of right dorsal hand cellulitis.    *Right dorsum hand cellulitis  - s/p I&D w/ pinrose drain by Ortho on 9/23 --> to be removed on 9/25.   - cont IV Rocephin and Dapto day #3. Afebrile, no leukocytosis.   - f/u cultures.   - ibuprofen ATC for swelling and pain. Add prn percocet.   - await ID recs for abx upon discharge.     *Elevated LFTs - likely medication induced, on Oral Letrozole and Effexor.   - known hx of mildly elevated LFTs.   - RUQ Sono with hepatic steatosis, no cholescytitis. Asymptomatic. Reviewed diet and weight loss.   - hepatitis panel NEGATIVE.     # Hx of Breast Cancer - [place] [facility] [place], told in remission.   - cont daily Letrozole.     *DVT prophylaxis  - low risk, encourage ambulation    Dispo: remain inpatient, tentative dc home tomorrow pending clinical improvement.   Total time > 40 mins.

## 2017-09-24 NOTE — PROGRESS NOTE ADULT - SUBJECTIVE AND OBJECTIVE BOX
CC: hand infection    HPI: 42 yo female with PMH of breast cancer s/p b/l mastectomy presents to ED with complaint of right dorsal hand cellulitis. Pt states she was bitten by a mosquito and then noted to have erythema and swelling of hand X 1 day not improved on Clinda given by Urgent care X 1 day. Symptoms worsened and patient was given IM PCN at Urgent Care 2nd visit and referred to ED where she was given IV Zosyn and PO Bactrim (allergy to Vanc). She does have a history of cellulitis around right breast after mastectomy in the past.    9/23/17: Arm erythema resolved however right hand with pain / redness and now pus. Inquires about I&D. Afebrile.    9/24/17: Afebrile, had pinrose drain placed yesterday by Ortho. Not ready for discharge. Worried to go home with drain in place.     ROS: neg unless stated above.     Vital Signs Last 24 Hrs  T(C): 36.4 (24 Sep 2017 05:39), Max: 36.4 (23 Sep 2017 20:46)  T(F): 97.5 (24 Sep 2017 05:39), Max: 97.5 (23 Sep 2017 20:46)  HR: 78 (24 Sep 2017 05:39) (78 - 93)  BP: 141/76 (24 Sep 2017 05:39) (120/62 - 141/76)  BP(mean): --  RR: 18 (24 Sep 2017 05:39) (18 - 18)  SpO2: 97% (24 Sep 2017 05:39) (95% - 97%)    PHYSICAL EXAM:  Constitutional: NAD, awake and alert, well-developed female.   HEENT: PERR, EOMI, Normal Hearing, MMM  Neck: Soft and supple, No LAD, No JVD  Respiratory: Breath sounds are clear bilaterally, No wheezing, rales or rhonchi  Cardiovascular: S1 and S2, regular rate and rhythm, no Murmurs, gallops or rubs  Gastrointestinal: Bowel Sounds present, soft, nontender, nondistended, no guarding, no rebound  Extremities: No peripheral edema  Vascular: 2+ peripheral pulses  Neurological: A/O x 3, no focal deficits  Musculoskeletal: 5/5 strength b/l upper and lower extremities  Skin: Right hand with dressing, pinrose drain placed. Receding arm erythema. Left hand with minimal erythema.     MEDICATIONS  (STANDING):  cefTRIAXone   IVPB      cefTRIAXone   IVPB 1 Gram(s) IV Intermittent every 24 hours  venlafaxine XR. 150 milliGRAM(s) Oral at bedtime  letrozole 2.5 milliGRAM(s) Oral daily  cholecalciferol 4000 Unit(s) Oral daily  DAPTOmycin IVPB 360 milliGRAM(s) IV Intermittent every 24 hours      LABS: All Labs Reviewed:                        11.2   7.3   )-----------( 234      ( 22 Sep 2017 05:30 )             32.3   09-22    140  |  109<H>  |  11  ----------------------------<  93  3.9   |  24  |  0.70    Ca    8.9      22 Sep 2017 05:30    TPro  6.9  /  Alb  3.2<L>  /  TBili  0.3  /  DBili  x   /  AST  154<H>  /  ALT  185<H>  /  AlkPhos  166<H>  09-22  Culture - Blood (09.21.17 @ 21:07)    Specimen Source: .Blood None    Culture Results:   No growth to date.     US Abdomen Complete (09.21.17 @ 22:51) >  IMPRESSION:  Hepatic steatosis. No gallstones or evidence of acute cholecystitis.

## 2017-09-24 NOTE — PROGRESS NOTE ADULT - ASSESSMENT
42 YO F s/p bedside I&D R hand abscess (9/23/17)  Pain control  DVT ppx - encourage ambulation  WBAT  Keep dressing CDI  Maintain penrose drain  Cont IV abx per ID  Cont med management per primary team  Drain to be removed Monday 9/25 by Ortho  Dr. Ahmadi present at bedside and agrees with above plan  All pt questions answered  FU with Dr. Ahmadi on outpatient basis 2 days following DC from hospital  Dc planning

## 2017-09-25 ENCOUNTER — TRANSCRIPTION ENCOUNTER (OUTPATIENT)
Age: 42
End: 2017-09-25

## 2017-09-25 VITALS
DIASTOLIC BLOOD PRESSURE: 79 MMHG | RESPIRATION RATE: 16 BRPM | HEART RATE: 97 BPM | SYSTOLIC BLOOD PRESSURE: 124 MMHG | TEMPERATURE: 98 F

## 2017-09-25 LAB
-  AMPICILLIN/SULBACTAM: SIGNIFICANT CHANGE UP
-  AMPICILLIN/SULBACTAM: SIGNIFICANT CHANGE UP
-  CEFAZOLIN: SIGNIFICANT CHANGE UP
-  CEFAZOLIN: SIGNIFICANT CHANGE UP
-  CIPROFLOXACIN: SIGNIFICANT CHANGE UP
-  CIPROFLOXACIN: SIGNIFICANT CHANGE UP
-  CLINDAMYCIN: SIGNIFICANT CHANGE UP
-  CLINDAMYCIN: SIGNIFICANT CHANGE UP
-  DAPTOMYCIN: SIGNIFICANT CHANGE UP
-  DAPTOMYCIN: SIGNIFICANT CHANGE UP
-  ERYTHROMYCIN: SIGNIFICANT CHANGE UP
-  ERYTHROMYCIN: SIGNIFICANT CHANGE UP
-  GENTAMICIN: SIGNIFICANT CHANGE UP
-  GENTAMICIN: SIGNIFICANT CHANGE UP
-  LEVOFLOXACIN: SIGNIFICANT CHANGE UP
-  LEVOFLOXACIN: SIGNIFICANT CHANGE UP
-  LINEZOLID: SIGNIFICANT CHANGE UP
-  LINEZOLID: SIGNIFICANT CHANGE UP
-  MOXIFLOXACIN(AEROBIC): SIGNIFICANT CHANGE UP
-  MOXIFLOXACIN(AEROBIC): SIGNIFICANT CHANGE UP
-  OXACILLIN: SIGNIFICANT CHANGE UP
-  OXACILLIN: SIGNIFICANT CHANGE UP
-  PENICILLIN: SIGNIFICANT CHANGE UP
-  PENICILLIN: SIGNIFICANT CHANGE UP
-  RIFAMPIN: SIGNIFICANT CHANGE UP
-  RIFAMPIN: SIGNIFICANT CHANGE UP
-  TETRACYCLINE: SIGNIFICANT CHANGE UP
-  TETRACYCLINE: SIGNIFICANT CHANGE UP
-  TRIMETHOPRIM/SULFAMETHOXAZOLE: SIGNIFICANT CHANGE UP
-  TRIMETHOPRIM/SULFAMETHOXAZOLE: SIGNIFICANT CHANGE UP
-  VANCOMYCIN: SIGNIFICANT CHANGE UP
-  VANCOMYCIN: SIGNIFICANT CHANGE UP
ANION GAP SERPL CALC-SCNC: 6 MMOL/L — SIGNIFICANT CHANGE UP (ref 5–17)
BUN SERPL-MCNC: 14 MG/DL — SIGNIFICANT CHANGE UP (ref 7–23)
CALCIUM SERPL-MCNC: 9.1 MG/DL — SIGNIFICANT CHANGE UP (ref 8.5–10.1)
CHLORIDE SERPL-SCNC: 106 MMOL/L — SIGNIFICANT CHANGE UP (ref 96–108)
CO2 SERPL-SCNC: 26 MMOL/L — SIGNIFICANT CHANGE UP (ref 22–31)
CREAT SERPL-MCNC: 0.75 MG/DL — SIGNIFICANT CHANGE UP (ref 0.5–1.3)
GLUCOSE SERPL-MCNC: 137 MG/DL — HIGH (ref 70–99)
HCT VFR BLD CALC: 37.4 % — SIGNIFICANT CHANGE UP (ref 34.5–45)
HGB BLD-MCNC: 12.4 G/DL — SIGNIFICANT CHANGE UP (ref 11.5–15.5)
MCHC RBC-ENTMCNC: 29 PG — SIGNIFICANT CHANGE UP (ref 27–34)
MCHC RBC-ENTMCNC: 33.2 GM/DL — SIGNIFICANT CHANGE UP (ref 32–36)
MCV RBC AUTO: 87.3 FL — SIGNIFICANT CHANGE UP (ref 80–100)
METHOD TYPE: SIGNIFICANT CHANGE UP
METHOD TYPE: SIGNIFICANT CHANGE UP
PLATELET # BLD AUTO: 336 K/UL — SIGNIFICANT CHANGE UP (ref 150–400)
POTASSIUM SERPL-MCNC: 4.2 MMOL/L — SIGNIFICANT CHANGE UP (ref 3.5–5.3)
POTASSIUM SERPL-SCNC: 4.2 MMOL/L — SIGNIFICANT CHANGE UP (ref 3.5–5.3)
RBC # BLD: 4.28 M/UL — SIGNIFICANT CHANGE UP (ref 3.8–5.2)
RBC # FLD: 12.6 % — SIGNIFICANT CHANGE UP (ref 10.3–14.5)
SODIUM SERPL-SCNC: 138 MMOL/L — SIGNIFICANT CHANGE UP (ref 135–145)
WBC # BLD: 6.3 K/UL — SIGNIFICANT CHANGE UP (ref 3.8–10.5)
WBC # FLD AUTO: 6.3 K/UL — SIGNIFICANT CHANGE UP (ref 3.8–10.5)

## 2017-09-25 RX ADMIN — DAPTOMYCIN 114.4 MILLIGRAM(S): 500 INJECTION, POWDER, LYOPHILIZED, FOR SOLUTION INTRAVENOUS at 10:03

## 2017-09-25 RX ADMIN — Medication 600 MILLIGRAM(S): at 09:47

## 2017-09-25 RX ADMIN — Medication 600 MILLIGRAM(S): at 10:14

## 2017-09-25 RX ADMIN — Medication 4000 UNIT(S): at 12:14

## 2017-09-25 RX ADMIN — LETROZOLE 2.5 MILLIGRAM(S): 2.5 TABLET, FILM COATED ORAL at 12:14

## 2017-09-25 RX ADMIN — Medication 600 MILLIGRAM(S): at 00:21

## 2017-09-25 RX ADMIN — Medication 3 MILLIGRAM(S): at 00:42

## 2017-09-25 NOTE — DISCHARGE NOTE ADULT - MEDICATION SUMMARY - MEDICATIONS TO STOP TAKING
I will STOP taking the medications listed below when I get home from the hospital:    clindamycin 300 mg oral capsule  -- 1 cap(s) by mouth every 8 hours ** TOOK THREE DOSES STARTING 9/20/17 **

## 2017-09-25 NOTE — DISCHARGE NOTE ADULT - MEDICATION SUMMARY - MEDICATIONS TO TAKE
I will START or STAY ON the medications listed below when I get home from the hospital:    ibuprofen 600 mg oral tablet  -- 1 tab(s) by mouth every 6 hours, As needed, Moderate Pain  -- Indication: For Pain    venlafaxine 150 mg oral capsule, extended release  -- 1 cap(s) by mouth once a day (at bedtime)  -- Indication: For Depression / anxiety    doxycycline hyclate 100 mg oral tablet  -- 1 tab(s) by mouth 2 times a day   -- Avoid prolonged or excessive exposure to direct and/or artificial sunlight while taking this medication.  Do not take this drug if you are pregnant.  Finish all this medication unless otherwise directed by prescriber.  Medication should be taken with plenty of water.    -- Indication: For CELLULITIS    letrozole 2.5 mg oral tablet  -- 1 tab(s) by mouth once a day (at bedtime)  -- Indication: For History of breast cancer    calcium (as calcium citrate) 250 mg oral tablet  -- 1 tab(s) by mouth once a day  -- Indication: For Calcium    melatonin 3 mg oral tablet  -- 1 tab(s) by mouth once (at bedtime), As Needed insomnia  -- Indication: For Sleep    cholecalciferol 4000 intl units oral tablet  -- 1 tab(s) by mouth once a day  -- Indication: For Vit D

## 2017-09-25 NOTE — PROGRESS NOTE ADULT - ASSESSMENT
42 y/o F w R hand abscess  Analgesia  DVT ppx- encourage ambulation   Incentive spirometry  WBAT  Keep Dressing C/D/I  P/T  Continue care per primary team  Continue IV abx   FU with Dr. Ahmadi in the office in 2 days after d/c from hospital, please call for appointment   Discharge planning

## 2017-09-25 NOTE — DISCHARGE NOTE ADULT - HOSPITAL COURSE
CC: hand infection    HPI: 42 yo female with PMH of breast cancer s/p b/l mastectomy presents to ED with complaint of right dorsal hand cellulitis. Pt states she was bitten by a mosquito and then noted to have erythema and swelling of hand X 1 day not improved on Clinda given by Urgent care X 1 day. Symptoms worsened and patient was given IM PCN at Urgent Care 2nd visit and referred to ED where she was given IV Zosyn and PO Bactrim (allergy to Vanc). She does have a history of cellulitis around right breast after mastectomy in the past.    9/23/17: Arm erythema resolved however right hand with pain / redness and now pus. Inquires about I&D. Afebrile.    9/24/17: Afebrile, had pinrose drain placed yesterday by Ortho. Not ready for discharge. Worried to go home with drain in place.     9/25/17: Doing well, less hand pain. Pinrose drain removed today. Ready for dc.     ROS: neg unless stated above.     Vital Signs Last 24 Hrs  T(C): 36.6 (25 Sep 2017 04:51), Max: 36.7 (24 Sep 2017 12:57)  T(F): 97.8 (25 Sep 2017 04:51), Max: 98.1 (24 Sep 2017 12:57)  HR: 89 (25 Sep 2017 04:51) (89 - 101)  BP: 104/55 (25 Sep 2017 04:51) (104/55 - 134/80)  BP(mean): --  RR: 18 (25 Sep 2017 04:51) (18 - 18)  SpO2: 98% (25 Sep 2017 04:51) (97% - 99%)    PHYSICAL EXAM:  Constitutional: NAD, awake and alert, well-developed female.   HEENT: PERR, EOMI, Normal Hearing, MMM  Neck: Soft and supple, No LAD, No JVD  Respiratory: Breath sounds are clear bilaterally, No wheezing, rales or rhonchi  Cardiovascular: S1 and S2, regular rate and rhythm, no Murmurs, gallops or rubs  Gastrointestinal: Bowel Sounds present, soft, nontender, nondistended, no guarding, no rebound  Extremities: No peripheral edema  Vascular: 2+ peripheral pulses  Neurological: A/O x 3, no focal deficits  Musculoskeletal: 5/5 strength b/l upper and lower extremities  Skin: Right hand with dressing,changed today. No further forearm erythema. Left hand with minimal erythema.     All meds and labs reviewed.     · Assessment		  42 yo female with PMH of breast cancer s/p b/l mastectomy presents to ED with complaint of right dorsal hand cellulitis.    *Right dorsum hand cellulitis  - s/p I&D w/ pinrose drain by Ortho on 9/23 --> removed today.  - cont IV Rocephin and Dapto day #4. Afebrile, no leukocytosis.   - f/u cultures __> prelim w/ Staph chico.   - ibuprofen ATC for swelling and pain.  - discussed w/ ID --> transition to Doxy X 7 days to complete 10 day treatment.   - f/u with Dr. Ahmadi outpatient in 2 days.     *Elevated LFTs - likely medication induced, on Oral Letrozole and Effexor.   - known hx of mildly elevated LFTs.   - RUQ Sono with hepatic steatosis, no cholescytitis. Asymptomatic. Reviewed diet and weight loss.   - hepatitis panel NEGATIVE.     # Hx of Breast Cancer - follows at [facility], not on chemo, told in remission.   - cont daily Letrozole.     *DVT prophylaxis  - low risk, encourage ambulation    DC home today.   Total time > 40 mins.

## 2017-09-25 NOTE — DISCHARGE NOTE ADULT - PLAN OF CARE
Improvement Continue taking antibiotics, drink with full glass of water and avoid direct sun exposure - use sunscreen.

## 2017-09-25 NOTE — DISCHARGE NOTE ADULT - PATIENT PORTAL LINK FT
“You can access the FollowHealth Patient Portal, offered by [facility], by registering with the following website: [email or web address]”

## 2017-09-25 NOTE — PROGRESS NOTE ADULT - SUBJECTIVE AND OBJECTIVE BOX
Pt seen & examined. Pain controlled. No acute events overnight  All vital signs stable  Gen: NAD  RUE:  Penrose removed, no obvious signs of drainage, erythema around original abscess site, no signs of extension   Dressed with 4x4 and Ace bandage   +sensation C5-T1  +nerves anterior interosseus/posterior interosseus/radial/median/ulnar/musculocutaneous  +radial pulse  Soft compartments, - calf tenderness

## 2017-09-25 NOTE — DISCHARGE NOTE ADULT - CARE PROVIDER_API CALL
Salvador Ahmadi), Orthopaedic Surgery; Surgery of the Hand  [address] [place] [address]  Phone: [phone]  Fax: [phone]

## 2017-09-25 NOTE — DISCHARGE NOTE ADULT - CARE PLAN
Principal Discharge DX:	Cellulitis of right upper extremity  Goal:	Improvement  Instructions for follow-up, activity and diet:	Continue taking antibiotics, drink with full glass of water and avoid direct sun exposure - use sunscreen.

## 2017-09-27 LAB
CULTURE RESULTS: SIGNIFICANT CHANGE UP
CULTURE RESULTS: SIGNIFICANT CHANGE UP
SPECIMEN SOURCE: SIGNIFICANT CHANGE UP
SPECIMEN SOURCE: SIGNIFICANT CHANGE UP

## 2017-09-28 DIAGNOSIS — K76.0 FATTY (CHANGE OF) LIVER, NOT ELSEWHERE CLASSIFIED: ICD-10-CM

## 2017-09-28 DIAGNOSIS — Z85.3 PERSONAL HISTORY OF MALIGNANT NEOPLASM OF BREAST: ICD-10-CM

## 2017-09-28 DIAGNOSIS — L03.113 CELLULITIS OF RIGHT UPPER LIMB: ICD-10-CM

## 2017-09-28 DIAGNOSIS — R79.89 OTHER SPECIFIED ABNORMAL FINDINGS OF BLOOD CHEMISTRY: ICD-10-CM

## 2017-09-28 DIAGNOSIS — L02.511 CUTANEOUS ABSCESS OF RIGHT HAND: ICD-10-CM

## 2017-09-28 DIAGNOSIS — L98.499 NON-PRESSURE CHRONIC ULCER OF SKIN OF OTHER SITES WITH UNSPECIFIED SEVERITY: ICD-10-CM

## 2017-09-28 DIAGNOSIS — M79.603 PAIN IN ARM, UNSPECIFIED: ICD-10-CM

## 2017-09-28 LAB
CULTURE RESULTS: SIGNIFICANT CHANGE UP
CULTURE RESULTS: SIGNIFICANT CHANGE UP
ORGANISM # SPEC MICROSCOPIC CNT: SIGNIFICANT CHANGE UP
SPECIMEN SOURCE: SIGNIFICANT CHANGE UP
SPECIMEN SOURCE: SIGNIFICANT CHANGE UP

## 2019-12-26 NOTE — DISCHARGE NOTE ADULT - NS AS DC AMI YN
Dear Patients and Caregivers,    Each day we work diligently to eliminate any barriers to your care including working with your insurance coverage to preauthorize your facility administered medication treatment. Our goal is to be transparent with any anticipated concerns with coverage for your treatment. At the beginning of every year this goal is particularly challenging because of changes to insurance coverage.    How can you help?    Provide any new insurance card to the infusion nurse at your next appointment or enter the information into your [employer] account prior to January 1st 2020.     It is vital that if a  reaches out that you return their phone call in a timely manner. Due to regulations, the team is unable to leave detailed voicemails. When you return the finance teams call they will be able to inform you of the details so a decision about your appointment can be made.    Also please understand:    We go through this process each year and each year offers new challenges.    Insurance companies will not allow the reauthorization process to begin until 2020, not allowing us to work in advance on this process.    Even if you are not changing insurance plans, insurance companies often update their policies requiring all treatments to be reauthorized.    As institutions across the country work to reauthorize treatments, insurance companies sometimes have longer than usual wait times in their call centers and leads to delayed response to prior authorization requests.     Thank you for your patience as we work this process. We do strive to keep you updated throughout the process if there are any delays or if the process is taking longer than anticipated. Lastly please feel free to speak with one of our infusion finance specialists at your next appointment or via phone ([phone]) if you have any questions. Thank you for choosing [facility] for your care.      [facility] Triage and after  hours / weekends / holidays:  [phone]    Please call the triage or after hours line if you experience a temperature greater than or equal to 100.5, shaking chills, have uncontrolled nausea, vomiting and/or diarrhea, dizziness, shortness of breath, chest pain, bleeding, unexplained bruising, or if you have any other new/concerning symptoms, questions or concerns.      If you are having any concerning symptoms or wish to speak to a provider before your next infusion visit, please call your care coordinator or triage to notify them so we can adequately serve you.     If you need a refill on a narcotic prescription or other medication, please call before your infusion appointment.                 December 2019 Sunday Monday Tuesday Wednesday Thursday Friday Saturday   1     2     3     4     5     6     7       8     9     10     11    [facility] DRAW   8:30 AM   (15 min.)    [facility]    [facility] ONC INFUSION 180   9:00 AM   (180 min.)    ONCOLOGY INFUSION   [facility] 12     13    [facility] RETURN   8:30 AM   (15 min.)   Rod Barrios MD   [facility] 14       15     16     17     18    [facility] DRAW   8:30 AM   (15 min.)    [facility] DRAW   [facility] Draw    [facility] ONC INFUSION 180   9:00 AM   (180 min.)    ONCOLOGY INFUSION   [facility] 19     20     21       22     23    [facility] RETURN   5:15 PM   (30 min.)   Daryn Ponce MD   [facility] 24     25     26    [facility] DRAW   8:30 AM   (15 min.)    [facility] DRAW   [facility] Draw    [facility] ONC INFUSION 180   9:00 AM   (180 min.)    ONCOLOGY INFUSION   [facility] 27     28       29 30 31 January 2020 Sunday Monday Tuesday Wednesday Thursday Friday Saturday                  1     2     3     4       5     6     7     8      9     10    [facility] NEW RHINOLOGY   8:15 AM   (30 min.)   Rod Campos MD   [facility]    CT ABDOMEN PELVIS W   9:40 AM   (20 min.)   UCCT2   [facility] CT    [facility] RETURN  11:15 AM   (30 min.)   Dwight Chau MD   [facility] 11       12     13     14     15     16    [facility] RETURN   7:45 AM   (20 min.)   Imelda Sky MD   [facility] 17     18       19     20     21     22     23     24     25       26     27     28     29     30     31                         Recent Results (from the past 24 hour(s))   CBC with platelets differential    Collection Time: 12/26/19  8:57 AM   Result Value Ref Range    WBC 3.6 (L) 4.0 - 11.0 10e9/L    RBC Count 3.79 (L) 4.4 - 5.9 10e12/L    Hemoglobin 11.6 (L) 13.3 - 17.7 g/dL    Hematocrit 34.9 (L) 40.0 - 53.0 %    MCV 92 78 - 100 fl    MCH 30.6 26.5 - 33.0 pg    MCHC 33.2 31.5 - 36.5 g/dL    RDW 13.2 10.0 - 15.0 %    Platelet Count 148 (L) 150 - 450 10e9/L    Diff Method Automated Method     % Neutrophils 56.2 %    % Lymphocytes 29.2 %    % Monocytes 9.0 %    % Eosinophils 4.5 %    % Basophils 0.8 %    % Immature Granulocytes 0.3 %    Nucleated RBCs 0 0 /100    Absolute Neutrophil 2.0 1.6 - 8.3 10e9/L    Absolute Lymphocytes 1.0 0.8 - 5.3 10e9/L    Absolute Monocytes 0.3 0.0 - 1.3 10e9/L    Absolute Eosinophils 0.2 0.0 - 0.7 10e9/L    Absolute Basophils 0.0 0.0 - 0.2 10e9/L    Abs Immature Granulocytes 0.0 0 - 0.4 10e9/L    Absolute Nucleated RBC 0.0           no

## 2021-05-20 NOTE — H&P ADULT - PSYCHIATRIC
SHIFT SUMMARY: PATIENT IS A&OX4, PAIN IS WELL CONTROLED WITH SCHEDULED
TYLENOL. LOW GRADE TEMP ALSO OBSERVED, TYLENOL WAS EFFECTIVE FOR PAIN AND
TEMP. PATIENT HAS ONLY PUSHED THE PCA ONCE SINCE INTIATION.
USING INSENTIVE SPIROMETER, EFFECTIVLEY BUT NEEDS ENCOURAGEMENT. TOLERATING
SIPS AND CHIPS, NO COMPLIANTS OF NAUSEA. LAP SITE X4 HAVE DSD'S THAT ARE CD&I.
RON IN THE RUQ IS PATENT FOR A SEROSANQUINES FLUID. Affect and characteristics of appearance, verbalizations, behaviors are appropriate

## 2021-07-10 ENCOUNTER — INPATIENT (INPATIENT)
Facility: HOSPITAL | Age: 46
LOS: 2 days | Discharge: HOME | DRG: 103 | End: 2021-07-13
Attending: FAMILY MEDICINE | Admitting: HOSPITALIST
Payer: COMMERCIAL

## 2021-07-10 VITALS — WEIGHT: 179.9 LBS | HEIGHT: 69 IN

## 2021-07-10 DIAGNOSIS — Z90.12 ACQUIRED ABSENCE OF LEFT BREAST AND NIPPLE: Chronic | ICD-10-CM

## 2021-07-10 DIAGNOSIS — I63.9 CEREBRAL INFARCTION, UNSPECIFIED: ICD-10-CM

## 2021-07-10 LAB
ALBUMIN SERPL ELPH-MCNC: 4.2 G/DL — SIGNIFICANT CHANGE UP (ref 3.3–5)
ALP SERPL-CCNC: 148 U/L — HIGH (ref 40–120)
ALT FLD-CCNC: 91 U/L — HIGH (ref 12–78)
ANION GAP SERPL CALC-SCNC: 5 MMOL/L — SIGNIFICANT CHANGE UP (ref 5–17)
APTT BLD: 32.3 SEC — SIGNIFICANT CHANGE UP (ref 27.5–35.5)
AST SERPL-CCNC: 65 U/L — HIGH (ref 15–37)
BASE EXCESS BLDV CALC-SCNC: 1.6 MMOL/L — SIGNIFICANT CHANGE UP (ref -2–2)
BASOPHILS # BLD AUTO: 0.08 K/UL — SIGNIFICANT CHANGE UP (ref 0–0.2)
BASOPHILS NFR BLD AUTO: 0.8 % — SIGNIFICANT CHANGE UP (ref 0–2)
BILIRUB SERPL-MCNC: 0.3 MG/DL — SIGNIFICANT CHANGE UP (ref 0.2–1.2)
BUN SERPL-MCNC: 16 MG/DL — SIGNIFICANT CHANGE UP (ref 7–23)
CALCIUM SERPL-MCNC: 9.8 MG/DL — SIGNIFICANT CHANGE UP (ref 8.5–10.1)
CHLORIDE SERPL-SCNC: 102 MMOL/L — SIGNIFICANT CHANGE UP (ref 96–108)
CO2 SERPL-SCNC: 28 MMOL/L — SIGNIFICANT CHANGE UP (ref 22–31)
CREAT SERPL-MCNC: 1.27 MG/DL — SIGNIFICANT CHANGE UP (ref 0.5–1.3)
EOSINOPHIL # BLD AUTO: 0.23 K/UL — SIGNIFICANT CHANGE UP (ref 0–0.5)
EOSINOPHIL NFR BLD AUTO: 2.2 % — SIGNIFICANT CHANGE UP (ref 0–6)
GLUCOSE SERPL-MCNC: 90 MG/DL — SIGNIFICANT CHANGE UP (ref 70–99)
HCO3 BLDV-SCNC: 26 MMOL/L — SIGNIFICANT CHANGE UP (ref 21–29)
HCT VFR BLD CALC: 39.7 % — SIGNIFICANT CHANGE UP (ref 34.5–45)
HGB BLD-MCNC: 13.2 G/DL — SIGNIFICANT CHANGE UP (ref 11.5–15.5)
IMM GRANULOCYTES NFR BLD AUTO: 0.4 % — SIGNIFICANT CHANGE UP (ref 0–1.5)
INR BLD: 1.03 RATIO — SIGNIFICANT CHANGE UP (ref 0.88–1.16)
LYMPHOCYTES # BLD AUTO: 2.84 K/UL — SIGNIFICANT CHANGE UP (ref 1–3.3)
LYMPHOCYTES # BLD AUTO: 27.6 % — SIGNIFICANT CHANGE UP (ref 13–44)
MCHC RBC-ENTMCNC: 28.3 PG — SIGNIFICANT CHANGE UP (ref 27–34)
MCHC RBC-ENTMCNC: 33.2 GM/DL — SIGNIFICANT CHANGE UP (ref 32–36)
MCV RBC AUTO: 85.2 FL — SIGNIFICANT CHANGE UP (ref 80–100)
MONOCYTES # BLD AUTO: 0.83 K/UL — SIGNIFICANT CHANGE UP (ref 0–0.9)
MONOCYTES NFR BLD AUTO: 8.1 % — SIGNIFICANT CHANGE UP (ref 2–14)
NEUTROPHILS # BLD AUTO: 6.28 K/UL — SIGNIFICANT CHANGE UP (ref 1.8–7.4)
NEUTROPHILS NFR BLD AUTO: 60.9 % — SIGNIFICANT CHANGE UP (ref 43–77)
PCO2 BLDV: 45 MMHG — SIGNIFICANT CHANGE UP (ref 35–50)
PH BLDV: 7.39 — SIGNIFICANT CHANGE UP (ref 7.35–7.45)
PLATELET # BLD AUTO: 355 K/UL — SIGNIFICANT CHANGE UP (ref 150–400)
PO2 BLDV: 35 MMHG — SIGNIFICANT CHANGE UP (ref 25–45)
POTASSIUM SERPL-MCNC: 3.9 MMOL/L — SIGNIFICANT CHANGE UP (ref 3.5–5.3)
POTASSIUM SERPL-SCNC: 3.9 MMOL/L — SIGNIFICANT CHANGE UP (ref 3.5–5.3)
PROT SERPL-MCNC: 8.3 GM/DL — SIGNIFICANT CHANGE UP (ref 6–8.3)
PROTHROM AB SERPL-ACNC: 11.9 SEC — SIGNIFICANT CHANGE UP (ref 10.6–13.6)
RBC # BLD: 4.66 M/UL — SIGNIFICANT CHANGE UP (ref 3.8–5.2)
RBC # FLD: 13.3 % — SIGNIFICANT CHANGE UP (ref 10.3–14.5)
SAO2 % BLDV: 60 % — LOW (ref 67–88)
SODIUM SERPL-SCNC: 135 MMOL/L — SIGNIFICANT CHANGE UP (ref 135–145)
TROPONIN I SERPL-MCNC: <0.015 NG/ML — SIGNIFICANT CHANGE UP (ref 0.01–0.04)
WBC # BLD: 10.3 K/UL — SIGNIFICANT CHANGE UP (ref 3.8–10.5)
WBC # FLD AUTO: 10.3 K/UL — SIGNIFICANT CHANGE UP (ref 3.8–10.5)

## 2021-07-10 PROCEDURE — 86769 SARS-COV-2 COVID-19 ANTIBODY: CPT

## 2021-07-10 PROCEDURE — 80074 ACUTE HEPATITIS PANEL: CPT

## 2021-07-10 PROCEDURE — 93010 ELECTROCARDIOGRAM REPORT: CPT

## 2021-07-10 PROCEDURE — 80053 COMPREHEN METABOLIC PANEL: CPT

## 2021-07-10 PROCEDURE — A9579: CPT

## 2021-07-10 PROCEDURE — 93306 TTE W/DOPPLER COMPLETE: CPT

## 2021-07-10 PROCEDURE — 70553 MRI BRAIN STEM W/O & W/DYE: CPT

## 2021-07-10 PROCEDURE — 97530 THERAPEUTIC ACTIVITIES: CPT | Mod: GP

## 2021-07-10 PROCEDURE — 36415 COLL VENOUS BLD VENIPUNCTURE: CPT

## 2021-07-10 PROCEDURE — 92610 EVALUATE SWALLOWING FUNCTION: CPT | Mod: GN

## 2021-07-10 PROCEDURE — 99285 EMERGENCY DEPT VISIT HI MDM: CPT

## 2021-07-10 PROCEDURE — 86140 C-REACTIVE PROTEIN: CPT

## 2021-07-10 PROCEDURE — 76705 ECHO EXAM OF ABDOMEN: CPT

## 2021-07-10 PROCEDURE — 85652 RBC SED RATE AUTOMATED: CPT

## 2021-07-10 PROCEDURE — 83036 HEMOGLOBIN GLYCOSYLATED A1C: CPT

## 2021-07-10 PROCEDURE — 70498 CT ANGIOGRAPHY NECK: CPT | Mod: 26,MA

## 2021-07-10 PROCEDURE — 95816 EEG AWAKE AND DROWSY: CPT

## 2021-07-10 PROCEDURE — 80061 LIPID PANEL: CPT

## 2021-07-10 PROCEDURE — 0042T: CPT

## 2021-07-10 PROCEDURE — 92523 SPEECH SOUND LANG COMPREHEN: CPT | Mod: GN

## 2021-07-10 PROCEDURE — 85027 COMPLETE CBC AUTOMATED: CPT

## 2021-07-10 PROCEDURE — 82962 GLUCOSE BLOOD TEST: CPT

## 2021-07-10 PROCEDURE — 97116 GAIT TRAINING THERAPY: CPT | Mod: GP

## 2021-07-10 PROCEDURE — 97162 PT EVAL MOD COMPLEX 30 MIN: CPT | Mod: GP

## 2021-07-10 PROCEDURE — 99222 1ST HOSP IP/OBS MODERATE 55: CPT

## 2021-07-10 PROCEDURE — 70496 CT ANGIOGRAPHY HEAD: CPT | Mod: 26,MA

## 2021-07-10 RX ORDER — CHOLECALCIFEROL (VITAMIN D3) 125 MCG
1 CAPSULE ORAL
Qty: 0 | Refills: 0 | DISCHARGE

## 2021-07-10 RX ORDER — BUPROPION HYDROCHLORIDE 150 MG/1
1 TABLET, EXTENDED RELEASE ORAL
Qty: 0 | Refills: 0 | DISCHARGE

## 2021-07-10 RX ORDER — HYDROXYZINE HCL 10 MG
50 TABLET ORAL ONCE
Refills: 0 | Status: DISCONTINUED | OUTPATIENT
Start: 2021-07-10 | End: 2021-07-10

## 2021-07-10 RX ORDER — LETROZOLE 2.5 MG/1
2.5 TABLET, FILM COATED ORAL ONCE
Refills: 0 | Status: DISCONTINUED | OUTPATIENT
Start: 2021-07-10 | End: 2021-07-10

## 2021-07-10 RX ORDER — ALTEPLASE 100 MG
66.1 KIT INTRAVENOUS ONCE
Refills: 0 | Status: DISCONTINUED | OUTPATIENT
Start: 2021-07-10 | End: 2021-07-10

## 2021-07-10 RX ORDER — VENLAFAXINE HCL 75 MG
1 CAPSULE, EXT RELEASE 24 HR ORAL
Qty: 0 | Refills: 0 | DISCHARGE

## 2021-07-10 RX ORDER — LETROZOLE 2.5 MG/1
1 TABLET, FILM COATED ORAL
Qty: 0 | Refills: 0 | DISCHARGE

## 2021-07-10 RX ORDER — ALTEPLASE 100 MG
7.3 KIT INTRAVENOUS ONCE
Refills: 0 | Status: DISCONTINUED | OUTPATIENT
Start: 2021-07-10 | End: 2021-07-10

## 2021-07-10 RX ORDER — LANOLIN ALCOHOL/MO/W.PET/CERES
1 CREAM (GRAM) TOPICAL
Qty: 0 | Refills: 0 | DISCHARGE

## 2021-07-10 RX ORDER — VENLAFAXINE HCL 75 MG
150 CAPSULE, EXT RELEASE 24 HR ORAL ONCE
Refills: 0 | Status: DISCONTINUED | OUTPATIENT
Start: 2021-07-10 | End: 2021-07-10

## 2021-07-10 RX ORDER — ASPIRIN/CALCIUM CARB/MAGNESIUM 324 MG
81 TABLET ORAL DAILY
Refills: 0 | Status: DISCONTINUED | OUTPATIENT
Start: 2021-07-10 | End: 2021-07-10

## 2021-07-10 RX ORDER — ASPIRIN/CALCIUM CARB/MAGNESIUM 324 MG
300 TABLET ORAL DAILY
Refills: 0 | Status: DISCONTINUED | OUTPATIENT
Start: 2021-07-10 | End: 2021-07-11

## 2021-07-10 RX ORDER — HYDROXYZINE HCL 10 MG
1 TABLET ORAL
Qty: 0 | Refills: 0 | DISCHARGE

## 2021-07-10 RX ORDER — SODIUM CHLORIDE 9 MG/ML
1000 INJECTION INTRAMUSCULAR; INTRAVENOUS; SUBCUTANEOUS
Refills: 0 | Status: DISCONTINUED | OUTPATIENT
Start: 2021-07-10 | End: 2021-07-11

## 2021-07-10 NOTE — ED PROVIDER NOTE - PROGRESS NOTE DETAILS
Abby LUNA for ED attending, Dr. Salinas: Saúl neurologist  is recommending TPA. Abby LUNA for ED attending, Dr. Salinas: Pt refused TPA

## 2021-07-10 NOTE — ED STATDOCS - PROGRESS NOTE DETAILS
Pt comes to the Ed complaining of facial droop and left sided upper extremity weakness. Pt states this started approximately 30mins to 1 hr prior. No head trauma no headache. No previous stroke hx no anticoagulation use. Code stroke called

## 2021-07-10 NOTE — ED ADULT NURSE NOTE - OBJECTIVE STATEMENT
Pt presents to the ED stating that about an hour PTA she began to experience left sided weakness, numbness and tingling on the left side of face and body. Denies hx of stroke. Denies CP/SOB. Denies anticoagulant use. +left sided facial droop. Pt refusing TPA.

## 2021-07-10 NOTE — ED PROVIDER NOTE - OBJECTIVE STATEMENT
44 y/o F w/ PMHx of breast CA presents to ED w/ left facial droop x1 hr prior to arrival. Pt endorses numbness and shakiness.

## 2021-07-10 NOTE — ED PROVIDER NOTE - CLINICAL SUMMARY MEDICAL DECISION MAKING FREE TEXT BOX
pt w/ facial droop x1 hour prior to arrival and forehead sparring w/ left sided weakness. Concern for acute stroke. Will CT/CTA. Will discuss w/ javier neurology. Possible TPA.

## 2021-07-10 NOTE — CHART NOTE - NSCHARTNOTEFT_GEN_A_CORE
First called via telephone by transfer team 2050hrs for patient Molly Blunt at [facility] ED.  Stroke Fellow located at home.  Dr. Cruz connected via phone call.  Patient is 45 year old female with remote history of breast cancer (reportedly in remission with no history of metastasis to brain) with 1 hour onset of left sided lower facial droop (sparing the forehead).  Daughter was with patient at time to confirm time of onset.  ED physician gives patient NIHSS 3.  2 for face, 1 for weakness in arm.  CT head reviewed on Rapid software.  CT angio/perfusion requested as well.  Case discussed with attending stroke physician.  Decision for tPA made, discussed with ED physician.  CT perfusion negative for large vessel occlusion.  No need for transfer.    Hanna Sanchez MD  PGY5  Vascular Neurology

## 2021-07-10 NOTE — ED ADULT NURSE NOTE - CHIEF COMPLAINT QUOTE
patient presents to the ED ambulatory co left sided facial droop, left sided facial numbness and tingling, and left sided arm weakness/slurred speech. onset of symptoms 45 minutes pta. pt not on blood thinners or ASA, hx of breast ca. dr berkowitz out to evaluate pt, code stroke activated as 2041, pt brought directly to ct scan. bgm 94

## 2021-07-10 NOTE — ED ADULT NURSE NOTE - NSIMPLEMENTINTERV_GEN_ALL_ED
Implemented All Fall Risk Interventions:  [place] to call system. Call bell, personal items and telephone within reach. Instruct patient to call for assistance. Room bathroom lighting operational. Non-slip footwear when patient is off stretcher. Physically safe environment: no spills, clutter or unnecessary equipment. Stretcher in lowest position, wheels locked, appropriate side rails in place. Provide visual cue, wrist band, yellow gown, etc. Monitor gait and stability. Monitor for mental status changes and reorient to person, place, and time. Review medications for side effects contributing to fall risk. Reinforce activity limits and safety measures with patient and family.

## 2021-07-11 ENCOUNTER — TRANSCRIPTION ENCOUNTER (OUTPATIENT)
Age: 46
End: 2021-07-11

## 2021-07-11 PROBLEM — C50.919 MALIGNANT NEOPLASM OF UNSPECIFIED SITE OF UNSPECIFIED FEMALE BREAST: Chronic | Status: ACTIVE | Noted: 2017-09-21

## 2021-07-11 LAB
A1C WITH ESTIMATED AVERAGE GLUCOSE RESULT: 6 % — HIGH (ref 4–5.6)
CHOLEST SERPL-MCNC: 292 MG/DL — HIGH
COVID-19 SPIKE DOMAIN AB INTERP: POSITIVE
COVID-19 SPIKE DOMAIN ANTIBODY RESULT: >250 U/ML — HIGH
ESTIMATED AVERAGE GLUCOSE: 126 MG/DL — HIGH (ref 68–114)
HDLC SERPL-MCNC: 42 MG/DL — LOW
LIPID PNL WITH DIRECT LDL SERPL: 208 MG/DL — HIGH
NON HDL CHOLESTEROL: 250 MG/DL — HIGH
SARS-COV-2 IGG+IGM SERPL QL IA: >250 U/ML — HIGH
SARS-COV-2 IGG+IGM SERPL QL IA: POSITIVE
SARS-COV-2 RNA SPEC QL NAA+PROBE: SIGNIFICANT CHANGE UP
TRIGL SERPL-MCNC: 208 MG/DL — HIGH

## 2021-07-11 PROCEDURE — 99233 SBSQ HOSP IP/OBS HIGH 50: CPT

## 2021-07-11 PROCEDURE — 70553 MRI BRAIN STEM W/O & W/DYE: CPT | Mod: 26

## 2021-07-11 PROCEDURE — 99223 1ST HOSP IP/OBS HIGH 75: CPT

## 2021-07-11 RX ORDER — LETROZOLE 2.5 MG/1
2.5 TABLET, FILM COATED ORAL DAILY
Refills: 0 | Status: DISCONTINUED | OUTPATIENT
Start: 2021-07-11 | End: 2021-07-13

## 2021-07-11 RX ORDER — CLONAZEPAM 1 MG
0.5 TABLET ORAL ONCE
Refills: 0 | Status: DISCONTINUED | OUTPATIENT
Start: 2021-07-11 | End: 2021-07-11

## 2021-07-11 RX ORDER — HYDROXYZINE HCL 10 MG
50 TABLET ORAL
Refills: 0 | Status: DISCONTINUED | OUTPATIENT
Start: 2021-07-11 | End: 2021-07-13

## 2021-07-11 RX ORDER — BUPROPION HYDROCHLORIDE 150 MG/1
150 TABLET, EXTENDED RELEASE ORAL DAILY
Refills: 0 | Status: DISCONTINUED | OUTPATIENT
Start: 2021-07-11 | End: 2021-07-13

## 2021-07-11 RX ORDER — VENLAFAXINE HCL 75 MG
150 CAPSULE, EXT RELEASE 24 HR ORAL AT BEDTIME
Refills: 0 | Status: DISCONTINUED | OUTPATIENT
Start: 2021-07-11 | End: 2021-07-13

## 2021-07-11 RX ORDER — ASPIRIN/CALCIUM CARB/MAGNESIUM 324 MG
81 TABLET ORAL DAILY
Refills: 0 | Status: DISCONTINUED | OUTPATIENT
Start: 2021-07-11 | End: 2021-07-13

## 2021-07-11 RX ORDER — ACETAMINOPHEN 500 MG
1000 TABLET ORAL ONCE
Refills: 0 | Status: COMPLETED | OUTPATIENT
Start: 2021-07-11 | End: 2021-07-11

## 2021-07-11 RX ORDER — ONDANSETRON 8 MG/1
4 TABLET, FILM COATED ORAL EVERY 8 HOURS
Refills: 0 | Status: DISCONTINUED | OUTPATIENT
Start: 2021-07-11 | End: 2021-07-13

## 2021-07-11 RX ADMIN — BUPROPION HYDROCHLORIDE 150 MILLIGRAM(S): 150 TABLET, EXTENDED RELEASE ORAL at 12:43

## 2021-07-11 RX ADMIN — Medication 81 MILLIGRAM(S): at 12:45

## 2021-07-11 RX ADMIN — ONDANSETRON 4 MILLIGRAM(S): 8 TABLET, FILM COATED ORAL at 09:02

## 2021-07-11 RX ADMIN — Medication 0.5 MILLIGRAM(S): at 13:20

## 2021-07-11 RX ADMIN — Medication 400 MILLIGRAM(S): at 01:51

## 2021-07-11 RX ADMIN — Medication 150 MILLIGRAM(S): at 21:08

## 2021-07-11 RX ADMIN — SODIUM CHLORIDE 75 MILLILITER(S): 9 INJECTION INTRAMUSCULAR; INTRAVENOUS; SUBCUTANEOUS at 01:24

## 2021-07-11 NOTE — CONSULT NOTE ADULT - ASSESSMENT
45 year old woman with history of breast CA 9 years ago admitted with sudden onset of left lower facial droop, subtle left sided weakness and subjective decreased sensation over left face and left arm and leg.    -Possible stroke  -MRI brain pending. Will include contrast with her history of cancer  -Continue aspirin. Can change to PO when cleared  -No evidence of significant intracranial or extracranial stenosis.  -Continue monitoring on telemetry  -Confirm doses of venlafaxine and buproprion and can restart  -If patient is found to have a stroke, will need to speak to her heme/onc about letrozole which can potentially increase the risk of thromboembolism/stroke.  -PT  -Speech eval    Will follow

## 2021-07-11 NOTE — DISCHARGE NOTE NURSING/CASE MANAGEMENT/SOCIAL WORK - PATIENT PORTAL LINK FT
You can access the FollowMyHealth Patient Portal offered by [facility] by registering at the following website: [email or web address]. By joining [facility]’s FollowMyHealth portal, you will also be able to view your health information using other applications (apps) compatible with our system.

## 2021-07-11 NOTE — H&P ADULT - NSHPPHYSICALEXAM_GEN_ALL_CORE
Vital Signs Last 24 Hrs  T(C): 36.9 (10 Jul 2021 22:10), Max: 36.9 (10 Jul 2021 22:10)  T(F): 98.4 (10 Jul 2021 22:10), Max: 98.4 (10 Jul 2021 22:10)  HR: 104 (10 Jul 2021 22:10) (103 - 104)  BP: 124/68 (10 Jul 2021 22:10) (124/68 - 126/74)  BP(mean): 82 (10 Jul 2021 22:10) (82 - 87)  RR: 12 (10 Jul 2021 22:10) (12 - 16)  SpO2: 98% (10 Jul 2021 22:10) (98% - 99%)

## 2021-07-11 NOTE — SWALLOW BEDSIDE ASSESSMENT ADULT - ASR SWALLOW LINGUAL MOBILITY
Pt's effort when executing volitional lingual actions was somewhat reduced and a possible slight right tongue drift was evident. With that being stated, her tongue strength/agility were functional.

## 2021-07-11 NOTE — SWALLOW BEDSIDE ASSESSMENT ADULT - SWALLOW EVAL: CRITERIA FOR SKILLED INTERVENTION MET
DO NOT FEEL THAT ACUTE SPEECH PATHOLOGY FOLLOW UP WOULD CHANGE CLINICAL MANAGEMENT/OUTCOME WHILE IN HOSPITAL. PT'S SPEECH-LANGUAGE ABILITIES AND OROPHARYNGEAL SWALLOWING ABILITIES ARE FUNCTIONAL/AT USUAL STATE. GIVEN ABOVE, WILL NOT ACTIVELY FOLLOW. RECONSULT PRN SHOULD STATUS CHANGE AND CONDITION WARRANT.

## 2021-07-11 NOTE — H&P ADULT - NSICDXFAMILYHX_GEN_ALL_CORE_FT
FAMILY HISTORY:  Father  Still living? Unknown  Family history of diabetes mellitus, Age at diagnosis: Age Unknown    Mother  Still living? Unknown  Family history of asthma, Age at diagnosis: Age Unknown

## 2021-07-11 NOTE — H&P ADULT - HISTORY OF PRESENT ILLNESS
45 year old Female with remote history of breast cancer (reportedly in remission with no history of metastasis to brain) with 1 hour onset(before coming to the ER) of left sided lower facial droop (sparing the forehead).  Daughter was with patient at time to confirm time of onset.  ED physician gives patient NIHSS 3.  2 for face, 1 for weakness in arm. During my eval patient still has the left sided facial droop, left upper ext weakness and also has some weakness in the left lower extremity. No prior h/o CVA. No other complain today.

## 2021-07-11 NOTE — PHYSICAL THERAPY INITIAL EVALUATION ADULT - MODALITIES TREATMENT COMMENTS
LUE and LLE strength 4/5. Pt. Ind with functional mobility such as bed mob, transfers and Amb with NAD. Skilled PT not required at this time. Pt. may amb with NSG staff while being at hospital PRN

## 2021-07-11 NOTE — CONSULT NOTE ADULT - SUBJECTIVE AND OBJECTIVE BOX
Patient is a 45y old  Female who presents with a chief complaint of Facial droop and Left sided weakness (11 Jul 2021 00:00)      HPI:  45 year old Female with remote history of breast cancer (reportedly in remission with no history of metastasis to brain) with 1 hour onset(before coming to the ER) of left sided lower facial droop (sparing the forehead).  Daughter was with patient at time to confirm time of onset.   She reports that symptoms started suddenly. She has no previous history of similar symptoms.  She denies any history of migraines. She denies history of blood clots and denies history of recent viral infections.  In the ED she reportedly had a NIHSS of 3. TPA was recommended and the patient refused.  She states that when she tried to swallow she felt numbness son the left side of her throat. She also reports that she had some difficulty finding some words which has since improved.         PAST MEDICAL & SURGICAL HISTORY:  Breast cancer  H/O mastectomy, left        FAMILY HISTORY:  Family history of asthma (Mother)  Family history of diabetes mellitus (Father)  Father - stroke        Social Hx:  Nonsmoker, no drug or alcohol use    MEDICATIONS  (STANDING):  aspirin Suppository 300 milliGRAM(s) Rectal daily  sodium chloride 0.9%. 1000 milliLiter(s) (75 mL/Hr) IV Continuous <Continuous>  Takes buproprion, venlafaxine and hydroxyzine at home.  Letrozole       Allergies    Erythromycin Base (Unknown)  strawberry (Unknown)  vancomycin (Hives)    Intolerances        ROS: Pertinent positives in HPI, all other ROS were reviewed and are negative.      Vital Signs Last 24 Hrs  T(C): 36.8 (11 Jul 2021 04:00), Max: 37.2 (11 Jul 2021 03:20)  T(F): 98.2 (11 Jul 2021 04:00), Max: 98.9 (11 Jul 2021 03:20)  HR: 85 (11 Jul 2021 04:00) (85 - 104)  BP: 116/67 (11 Jul 2021 04:00) (116/67 - 126/80)  BP(mean): 81 (11 Jul 2021 01:08) (81 - 87)  RR: 16 (11 Jul 2021 04:00) (12 - 17)  SpO2: 96% (11 Jul 2021 04:00) (96% - 99%)        Constitutional: awake and alert.  HEENT: PERRLA, EOMI,   Neck: Supple.  Respiratory: Breath sounds are clear bilaterally  Cardiovascular: S1 and S2, regular / irregular rhythm  Gastrointestinal: soft, nontender  Extremities:  no edema  Vascular: Carotid Bruit - no  Musculoskeletal: no joint swelling/tenderness, no abnormal movements  Skin: No rashes    Neurological exam:  HF: A x O x 3. Appropriately interactive, normal affect. Speech fluent, No Aphasia or paraphasic errors.   CN: RUBIN, EOMI, VFF, left lower facial weakness, decreased sensation over left upper and lower face, tongue protrudes to right  Motor: No pronator drift, Strength 5/5 in RUE and RLE, 5-/5 in LUE, 5/5 in LLE  Sens: subjective paresthesias with light touch over left arm and leg  Reflexes: Symmetric and normal . BJ 2+, BR 2+, KJ 2+, AJ 2+, downgoing toes b/l  Coord:  No FNFA, dysmetria, DEN intact       NIHSS: 2          Labs:   07-10    135  |  102  |  16  ----------------------------<  90  3.9   |  28  |  1.27    Ca    9.8      10 Jul 2021 20:48    TPro  8.3  /  Alb  4.2  /  TBili  0.3  /  DBili  x   /  AST  65<H>  /  ALT  91<H>  /  AlkPhos  148<H>  07-10                              13.2   10.30 )-----------( 355      ( 10 Jul 2021 20:48 )             39.7       Radiology:  CT head 7/10/21:  No acute intracranial hemorrhage or mass effect.    CT perfusion, CTA head and neck 7/10/21:  CT brain perfusion: No ischemic penumbra.    CTA head and neck: Patent cervical and intracranial major arterial vasculature without evidence of abrupt vessel cut off, hemodynamically significant stenosis, aneurysm, or dissection.

## 2021-07-11 NOTE — SWALLOW BEDSIDE ASSESSMENT ADULT - ADDITIONAL RECOMMENDATIONS
NEURO F/U. PT WITH MILD LEFT LOWER FACE/LIP WEAKNESS AND LEFT LUE/LLE WEAKNESS. PT ALSO WITH POSSIBLE SLIGHT RIGHT TONGUE DRIFT WHICH DOES NOT FIT IN WITH HER OTHER NEURO SEQUEL.

## 2021-07-11 NOTE — SWALLOW BEDSIDE ASSESSMENT ADULT - SWALLOW EVAL: DIAGNOSIS
1) Pt exhibits functional Oropharyngeal Swallowing abilities for age without behavioral aspiration signs, despite minimal left lower lip lag. NO behavioral aspiration signs exhibited. Odynophagia was denied.  2) The pt was alert and interactive. She was able to verbalize during communicative probes via intelligible, fluent, linguistically intact utterances. No dysarthria, verbal apraxia or aphasia was evident on exam. Pt able to verbalize needs and is reportedly at communicative baseline.

## 2021-07-11 NOTE — PHYSICAL THERAPY INITIAL EVALUATION ADULT - PERTINENT HX OF CURRENT PROBLEM, REHAB EVAL
45 year old Female with remote history of breast cancer (reportedly in remission with no history of metastasis to brain. Pt. s/p left sided lower facial droop 45 year old Female with remote history of breast cancer 9 years ago (reportedly in remission with no history of metastasis to brain. Pt. s/p left sided lower facial droop, L side weakness, No Aphasia or paraphasic 45 year old Female with remote history of breast cancer 9 years ago (reportedly in remission with no history of metastasis to brain. Pt. s/p left sided lower facial droop, decreased sensation over left upper and lower face ,L side weakness, No Aphasia or paraphasic. MRI brain pendding

## 2021-07-11 NOTE — SWALLOW BEDSIDE ASSESSMENT ADULT - SWALLOW EVAL: RECOMMENDED DIET
SUGGEST A REGULAR TEXTURE DIET WITH THIN LIQUIDS AS SHE TOLERATES THESE FOOD CONSISTENCIES FROM OROPHARYNGEAL SWALLOWING PERSPECTIVE ON CLINICAL EXAM.

## 2021-07-11 NOTE — SWALLOW BEDSIDE ASSESSMENT ADULT - SWALLOW EVAL: SECRETION MANAGEMENT
No drooling noted, despite slight left lip lag. Additionally, strength of pt's volitional cough was functional.

## 2021-07-11 NOTE — SWALLOW BEDSIDE ASSESSMENT ADULT - SLP GENERAL OBSERVATIONS
The pt was alert and interactive. She was able to verbalize during communicative probes via intelligible, fluent, linguistically intact utterances. No dysarthria, verbal apraxia or aphasia were evident on exam. Pt able to verbalize needs and is reportedly at communicative baseline.

## 2021-07-11 NOTE — SWALLOW BEDSIDE ASSESSMENT ADULT - COMMENTS
Pt was admitted to  with a left facial droop/facial numbness on left. This profile is superimposed upon a h/o past breast cancer status post left mastectomy.

## 2021-07-11 NOTE — H&P ADULT - ASSESSMENT
A/P:    1.  Acute Stroke  -patient refused tPA  -follow clinically with Neuro checks in telemetry unit  -patient did not pass the bedside swallow eval properly  -will keep NPO  -will hold off Statin and PO aspirin for now  -will try Rectal suppos of Aspirin  -follow hold off all the PO Meds  -follow Echo, follow MRI  -follow Neurology consult  -follow PT/OT eval  -follow Lipid profile and A1C    2.  SCD for DVT ppx    3.  Code status: Patient is in full code status.

## 2021-07-12 LAB
ADD ON TEST-SPECIMEN IN LAB: SIGNIFICANT CHANGE UP
ALBUMIN SERPL ELPH-MCNC: 3.6 G/DL — SIGNIFICANT CHANGE UP (ref 3.3–5)
ALP SERPL-CCNC: 125 U/L — HIGH (ref 40–120)
ALT FLD-CCNC: 76 U/L — SIGNIFICANT CHANGE UP (ref 12–78)
ANION GAP SERPL CALC-SCNC: 5 MMOL/L — SIGNIFICANT CHANGE UP (ref 5–17)
AST SERPL-CCNC: 44 U/L — HIGH (ref 15–37)
BILIRUB SERPL-MCNC: 0.3 MG/DL — SIGNIFICANT CHANGE UP (ref 0.2–1.2)
BUN SERPL-MCNC: 13 MG/DL — SIGNIFICANT CHANGE UP (ref 7–23)
CALCIUM SERPL-MCNC: 9.3 MG/DL — SIGNIFICANT CHANGE UP (ref 8.5–10.1)
CHLORIDE SERPL-SCNC: 107 MMOL/L — SIGNIFICANT CHANGE UP (ref 96–108)
CO2 SERPL-SCNC: 28 MMOL/L — SIGNIFICANT CHANGE UP (ref 22–31)
CREAT SERPL-MCNC: 0.81 MG/DL — SIGNIFICANT CHANGE UP (ref 0.5–1.3)
GLUCOSE SERPL-MCNC: 151 MG/DL — HIGH (ref 70–99)
HAV IGM SER-ACNC: SIGNIFICANT CHANGE UP
HBV CORE IGM SER-ACNC: SIGNIFICANT CHANGE UP
HBV SURFACE AG SER-ACNC: SIGNIFICANT CHANGE UP
HCT VFR BLD CALC: 38.2 % — SIGNIFICANT CHANGE UP (ref 34.5–45)
HCV AB S/CO SERPL IA: 0.07 S/CO — SIGNIFICANT CHANGE UP (ref 0–0.99)
HCV AB SERPL-IMP: SIGNIFICANT CHANGE UP
HGB BLD-MCNC: 12.1 G/DL — SIGNIFICANT CHANGE UP (ref 11.5–15.5)
MCHC RBC-ENTMCNC: 27.8 PG — SIGNIFICANT CHANGE UP (ref 27–34)
MCHC RBC-ENTMCNC: 31.7 GM/DL — LOW (ref 32–36)
MCV RBC AUTO: 87.6 FL — SIGNIFICANT CHANGE UP (ref 80–100)
PLATELET # BLD AUTO: 275 K/UL — SIGNIFICANT CHANGE UP (ref 150–400)
POTASSIUM SERPL-MCNC: 4.2 MMOL/L — SIGNIFICANT CHANGE UP (ref 3.5–5.3)
POTASSIUM SERPL-SCNC: 4.2 MMOL/L — SIGNIFICANT CHANGE UP (ref 3.5–5.3)
PROT SERPL-MCNC: 7.5 G/DL — SIGNIFICANT CHANGE UP (ref 6–8.3)
RBC # BLD: 4.36 M/UL — SIGNIFICANT CHANGE UP (ref 3.8–5.2)
RBC # FLD: 13.3 % — SIGNIFICANT CHANGE UP (ref 10.3–14.5)
SODIUM SERPL-SCNC: 140 MMOL/L — SIGNIFICANT CHANGE UP (ref 135–145)
WBC # BLD: 5.8 K/UL — SIGNIFICANT CHANGE UP (ref 3.8–10.5)
WBC # FLD AUTO: 5.8 K/UL — SIGNIFICANT CHANGE UP (ref 3.8–10.5)

## 2021-07-12 PROCEDURE — 93306 TTE W/DOPPLER COMPLETE: CPT | Mod: 26

## 2021-07-12 PROCEDURE — 76705 ECHO EXAM OF ABDOMEN: CPT | Mod: 26

## 2021-07-12 PROCEDURE — 95816 EEG AWAKE AND DROWSY: CPT | Mod: 26

## 2021-07-12 PROCEDURE — 99233 SBSQ HOSP IP/OBS HIGH 50: CPT

## 2021-07-12 PROCEDURE — 99232 SBSQ HOSP IP/OBS MODERATE 35: CPT

## 2021-07-12 RX ORDER — KETOROLAC TROMETHAMINE 30 MG/ML
15 SYRINGE (ML) INJECTION EVERY 6 HOURS
Refills: 0 | Status: DISCONTINUED | OUTPATIENT
Start: 2021-07-12 | End: 2021-07-13

## 2021-07-12 RX ORDER — METOCLOPRAMIDE HCL 10 MG
10 TABLET ORAL EVERY 8 HOURS
Refills: 0 | Status: DISCONTINUED | OUTPATIENT
Start: 2021-07-12 | End: 2021-07-13

## 2021-07-12 RX ORDER — METOCLOPRAMIDE HCL 10 MG
10 TABLET ORAL ONCE
Refills: 0 | Status: COMPLETED | OUTPATIENT
Start: 2021-07-12 | End: 2021-07-12

## 2021-07-12 RX ADMIN — Medication 81 MILLIGRAM(S): at 09:02

## 2021-07-12 RX ADMIN — Medication 150 MILLIGRAM(S): at 21:24

## 2021-07-12 RX ADMIN — Medication 15 MILLIGRAM(S): at 16:42

## 2021-07-12 RX ADMIN — Medication 15 MILLIGRAM(S): at 23:20

## 2021-07-12 RX ADMIN — Medication 10 MILLIGRAM(S): at 14:17

## 2021-07-12 RX ADMIN — Medication 15 MILLIGRAM(S): at 22:45

## 2021-07-12 RX ADMIN — LETROZOLE 2.5 MILLIGRAM(S): 2.5 TABLET, FILM COATED ORAL at 21:25

## 2021-07-12 RX ADMIN — BUPROPION HYDROCHLORIDE 150 MILLIGRAM(S): 150 TABLET, EXTENDED RELEASE ORAL at 09:02

## 2021-07-12 NOTE — DIETITIAN INITIAL EVALUATION ADULT. - NAME AND PHONE
Audrey Reid RDN, CDN, [facility]      [phone]   [email or web address]

## 2021-07-12 NOTE — DIETITIAN INITIAL EVALUATION ADULT. - PERTINENT MEDS FT
MEDICATIONS  (STANDING):  aspirin enteric coated 81 milliGRAM(s) Oral daily  buPROPion XL (24-Hour) . 150 milliGRAM(s) Oral daily  letrozole 2.5 milliGRAM(s) Oral daily  venlafaxine XR. 150 milliGRAM(s) Oral at bedtime    MEDICATIONS  (PRN):  hydrOXYzine hydrochloride 50 milliGRAM(s) Oral two times a day PRN Anxiety  ondansetron Injectable 4 milliGRAM(s) IV Push every 8 hours PRN Nausea and/or Vomiting

## 2021-07-12 NOTE — DIETITIAN INITIAL EVALUATION ADULT. - PERTINENT LABORATORY DATA
07-10    135  |  102  |  16  ----------------------------<  90  3.9   |  28  |  1.27    Ca    9.8      10 Jul 2021 20:48    TPro  8.3  /  Alb  4.2  /  TBili  0.3  /  DBili  x   /  AST  65<H>  /  ALT  91<H>  /  AlkPhos  148<H>  07-10

## 2021-07-12 NOTE — DIETITIAN INITIAL EVALUATION ADULT. - OTHER INFO
HPI:  45 year old Female with remote history of breast cancer (reportedly in remission with no history of metastasis to brain) with 1 hour onset(before coming to the ER) of left sided lower facial droop (sparing the forehead).  Daughter was with patient at time to confirm time of onset.  ED physician gives patient NIHSS 3.  2 for face, 1 for weakness in arm. During my eval patient still has the left sided facial droop, left upper ext weakness and also has some weakness in the left lower extremity. No prior h/o CVA. No other complain today.    Spoke with pt, who is having no issues eating, no GI complaints.  PT reports wt gain of 20 lbs in 3-4 months, only explaining that she is "eating more."  Pt educated on cardiac diet, diet for pre-diabetes.  Pt on POCT, pt A1c is 6.0  BG currently controlled

## 2021-07-12 NOTE — DIETITIAN INITIAL EVALUATION ADULT. - LITERATURE/VIDEOS GIVEN
DIabetes Plate Method   [facility]      FDA Sodium tips.      Heart Health MNT- reducing sodium intake - [email or web address]

## 2021-07-13 ENCOUNTER — TRANSCRIPTION ENCOUNTER (OUTPATIENT)
Age: 46
End: 2021-07-13

## 2021-07-13 VITALS
DIASTOLIC BLOOD PRESSURE: 76 MMHG | SYSTOLIC BLOOD PRESSURE: 114 MMHG | RESPIRATION RATE: 17 BRPM | HEART RATE: 82 BPM | TEMPERATURE: 98 F | OXYGEN SATURATION: 95 %

## 2021-07-13 PROCEDURE — 99232 SBSQ HOSP IP/OBS MODERATE 35: CPT

## 2021-07-13 PROCEDURE — 99239 HOSP IP/OBS DSCHRG MGMT >30: CPT

## 2021-07-13 RX ORDER — ATORVASTATIN CALCIUM 80 MG/1
1 TABLET, FILM COATED ORAL
Qty: 30 | Refills: 0
Start: 2021-07-13 | End: 2021-08-11

## 2021-07-13 RX ORDER — ASPIRIN/CALCIUM CARB/MAGNESIUM 324 MG
1 TABLET ORAL
Qty: 0 | Refills: 0 | DISCHARGE
Start: 2021-07-13

## 2021-07-13 RX ADMIN — BUPROPION HYDROCHLORIDE 150 MILLIGRAM(S): 150 TABLET, EXTENDED RELEASE ORAL at 10:08

## 2021-07-13 RX ADMIN — Medication 81 MILLIGRAM(S): at 10:09

## 2021-07-13 RX ADMIN — Medication 10 MILLIGRAM(S): at 10:55

## 2021-07-13 RX ADMIN — Medication 15 MILLIGRAM(S): at 10:55

## 2021-07-13 NOTE — PROGRESS NOTE ADULT - ASSESSMENT
45 year old Female with remote history of breast cancer (reportedly in remission with no history of metastasis to brain) admitted for:     1. L  facial droop and LUE weakness, unclear etiology, Acute CVA ruled out   - CTa head/neck l neg  - MRI head no acute infarcts or lesions, small venous malformation   - ECHO: EF 50-55%, Normal KV Fx   - patient refused tPA on admission  -C/w neuro checks  - Cleared by S&S for regular texture diet, started on DASH diet   - D/w Dr Rodriguez, will check EEG, possible complex migraines? Trial of Reglan and Toradol.   -will check ESR/CRP  -C/w ASA 81mg QD         2. Hyperlipidemia  low fat diet   start low dose statins       3. Elevated LFTS, trending down   F/u acute hepatitis   panel  RUQ sono: fatty liver   F/u with PCP to monitor  LFTs on statins     4. Anxiety/ Panic attacks    resumed  on home meds  supportive care     SCD for DVT ppx    Code status: Patient is in full code status.         Dispo; C/w current care, trial of Toradol and reglan, EEG 
 45 year old Female with remote history of breast cancer (reportedly in remission with no history of metastasis to brain) admitted for:     1. Lm facial droop and LUE weakness suspicious for Acute Stroke  - CTa head/neck l neg   - patient refused tPA  -C/w neuro checks, some improvement   - Cleared by S&S for regular texture diet, started on DASH diet   - Start ASA 81mg QD   - Will hold off statin as LFTS are elevated   - MRI pending   - ECHO   -C/w tele: SR  - Monitor BP  - D/w DR Rodriguez      2. Hyperlipidemia  low fat diet   will need to start on statins if LFTs stable/improving       3. Elevated LFTS  check acute hepatitis   panel  Check RUQ sono  repeat LFTS in am     4. Anxiety/ Panic attacks    resume home meds  supportive care     SCD for DVT ppx    Dispo: MRI, labs in am   Code status: Patient is in full code status.   
45 year old woman with history of breast CA 9 years ago admitted with sudden onset of left lower facial droop, subtle left sided weakness and subjective decreased sensation over left face and left arm and leg.    -Symptoms improved but still present  -MRI brain is negative for infarct and metastatic disease  -Patient has a developmental venous anomaly in the right corona radiata. These are typically asymptomatic but can rarely cause neurological deficits.   -Can get EEG although I doubt we will  abnormal activity on surface EEG.   - ? migraine phenomenon    Will follow    
45 year old woman with history of breast CA 9 years ago admitted with sudden onset of left lower facial droop, subtle left sided weakness and subjective decreased sensation over left face and left arm and leg.    -Symptoms improved but still present  -MRI brain is negative for infarct and metastatic disease  -Patient has a developmental venous anomaly in the right corona radiata. These are typically asymptomatic but can rarely cause neurological deficits. These can sometimes be associated with cavernomas which can cause neurological symptoms.   -EEG is normal  -Suggest outpatient follow up with repeat MRI on 3 sharath magnet with GRE sequences to r/o any microhemorrhages  -Can continue aspirin for now.

## 2021-07-13 NOTE — DISCHARGE NOTE PROVIDER - HOSPITAL COURSE
45 year old Female with remote history of breast cancer (reportedly in remission with no history of metastasis to brain) with 1 hour onset(before coming to the ER) of left sided lower facial droop (sparing the forehead).  Daughter was with patient at time to confirm time of onset.    In ED NIHSS 3. (2 for face, 1 for weakness in arm) . No prior h/o CVA.  Pt had CT head with CTA head and neck are neg for acute findings. Pt was admitted for further evaluation and management. HAd MRI done and was  neg for infarct  or  metastases. Pt also had EEG done and was neg. Pt was followed by neurology, unclear etiology of symptoms. Possible Complex migraines? Pt had L sided HA on/off. Was started on Reglan and Toradol PRN. Symptoms improved.  Hospital course significant for  labs  with elevated LFTS and Abnormal Lipid panel. RUQ sono with fatty liver. Started on low dose lipitor. Will need to f/u with PCP to repeat LFts.   Today Pt reports that feels better, still feels some numbness L face/lip. Some headache. POC discussed. Pt to f/u with Dr Rodriguez will  repeat MRI on 3 sharath magnet, may take Advil for HA.   Pt ambulates in the hallway with no issues     Vital Signs Last 24 Hrs  T(C): 36.6 (13 Jul 2021 08:27), Max: 36.6 (13 Jul 2021 08:27)  T(F): 97.8 (13 Jul 2021 08:27), Max: 97.8 (13 Jul 2021 08:27)  HR: 82 (13 Jul 2021 08:27) (79 - 92)  BP: 114/76 (13 Jul 2021 08:27) (105/65 - 122/78)  RR: 17 (13 Jul 2021 08:27) (17 - 18)  SpO2: 95% (13 Jul 2021 08:27) (95% - 97%)      PHYSICAL EXAM:  General: Well developed;  in no acute distress  Eyes: PERRLA, EOMI; conjunctiva and sclera clear  Head: Normocephalic; atraumatic  ENMT: No nasal discharge; airway clear  Neck: Supple; non tender; no masses  Respiratory: No wheezes, rales or rhonchi  Cardiovascular: Regular rate and rhythm. S1 and S2 Normal;   Gastrointestinal: Soft non-tender non-distended; Normal bowel sounds  Genitourinary: No  suprapubic  tenderness  Extremities:  No  edema  Vascular: Peripheral pulses palpable 2+ bilaterally  Neurological: Alert and oriented x3,  L facial droop, resolved, speech clear, min L arm weakness  Skin: Warm and dry. No acute rash  Musculoskeletal: Normal muscle tone, no joint  effusion   Psychiatric: Cooperative     45 year old Female with remote history of breast cancer (reportedly in remission with no history of metastasis to brain) admitted for:     1. L  facial droop and LUE weakness, unclear etiology, Acute CVA ruled out, no metastatic Dz  - Possible complex migraines?   - CTa head/neck l neg  - MRI head no acute infarcts or lesions, small venous malformation   - ECHO: EF 50-55%, Normal LV Fx   - EEg neg   - patient refused tPA on admission  -C/w neuro checks  - Cleared by S&S for regular texture diet, started on DASH diet   - ESR/CRP with no significant elevation   -C/w ASA 81mg QD   - D/w Dr Rodriguez, cleared fo r d/c, will follow outPt         2. Hyperlipidemia  low fat diet   start low dose statins       3. Elevated LFTS, trending down   acute hepatitis   panel neg   RUQ sono: fatty liver   F/u with PCP to monitor  LFTs on statins     4. Anxiety/ Panic attacks    resumed  on home meds  supportive care     SCD for DVT ppx    Code status: Patient is in full code status.         Dispo; stable for d/c home today   Fax d/c summary to PCP  Total time 42 min

## 2021-07-13 NOTE — PROGRESS NOTE ADULT - REASON FOR ADMISSION
Facial droop and Left sided weakness

## 2021-07-13 NOTE — DISCHARGE NOTE PROVIDER - NSDCMRMEDTOKEN_GEN_ALL_CORE_FT
aspirin 81 mg oral delayed release tablet: 1 tab(s) orally once a day  hydrOXYzine hydrochloride 50 mg oral tablet: 1 tab(s) orally 2 times a day  letrozole 2.5 mg oral tablet: 1 tab(s) orally once a day (at bedtime)  Lipitor 20 mg oral tablet: 1 tab(s) orally once a day (at bedtime)   venlafaxine 150 mg oral capsule, extended release: 1 cap(s) orally once a day (at bedtime)  Wellbutrin  mg/24 hours oral tablet, extended release: 1 tab(s) orally every 24 hours

## 2021-07-13 NOTE — DISCHARGE NOTE PROVIDER - NSDCCPCAREPLAN_GEN_ALL_CORE_FT
PRINCIPAL DISCHARGE DIAGNOSIS  Diagnosis: Numbness and tingling of left side of face  Assessment and Plan of Treatment: unclear cause, possible comolex migraines   Take Motrin or advil as needed for Headacje  C/w baby Aspirin   F/u with Dr Rodriguez, will likely need to have special MRI      SECONDARY DISCHARGE DIAGNOSES  Diagnosis: Abnormal liver enzymes  Assessment and Plan of Treatment: with Fatty  liver on Sonogram   Low fat/low carb diet   low dose lipitor  F/u with your PCP to repeat labs in 2 weeks    Diagnosis: HLD (hyperlipidemia)  Assessment and Plan of Treatment: low fat diet   lipitor 20mg daily   F/u with PCP    Diagnosis: Elevated glycohemoglobin  Assessment and Plan of Treatment: Pre diabetic   low carb diet, weight loss  F/u with PCP for further management

## 2021-07-13 NOTE — PROGRESS NOTE ADULT - SUBJECTIVE AND OBJECTIVE BOX
Interval History:  7/13/21: Feels slightly better today.   Had severe headache last night.     MEDICATIONS  (STANDING):  aspirin enteric coated 81 milliGRAM(s) Oral daily  buPROPion XL (24-Hour) . 150 milliGRAM(s) Oral daily  letrozole 2.5 milliGRAM(s) Oral daily  venlafaxine XR. 150 milliGRAM(s) Oral at bedtime    MEDICATIONS  (PRN):  hydrOXYzine hydrochloride 50 milliGRAM(s) Oral two times a day PRN Anxiety  ketorolac   Injectable 15 milliGRAM(s) IV Push every 6 hours PRN Moderate Pain (4 - 6) or HA  metoclopramide Injectable 10 milliGRAM(s) IV Push every 8 hours PRN nausea/headache  ondansetron Injectable 4 milliGRAM(s) IV Push every 8 hours PRN Nausea and/or Vomiting      Allergies    Erythromycin Base (Unknown)  strawberry (Unknown)  vancomycin (Hives)    Intolerances        PHYSICAL EXAM:  Vital Signs Last 24 Hrs  T(F): 97.8 (07-13-21 @ 08:27)  HR: 82 (07-13-21 @ 08:27)  BP: 114/76 (07-13-21 @ 08:27)  RR: 17 (07-13-21 @ 08:27)    GENERAL: NAD, well-groomed, well-developed  HEAD:  Atraumatic, Normocephalic  Neuro:  Awake, alert, no aphasia  CN: PERRL, EOMI, no nystagmus, very subtle left facial weakness. Decreased sensation over left face but improved compared to yesterday.  motor: normal tone, no pronator drift, full strength in all four extremities  sensory: mild decreased sensation in left arm and leg  coordination: finger to nose intact bilaterally  DTRs: symmetric, plantar responses flexor bilaterally    LABS:                        12.1   5.80  )-----------( 275      ( 12 Jul 2021 09:49 )             38.2     07-12    140  |  107  |  13  ----------------------------<  151<H>  4.2   |  28  |  0.81    Ca    9.3      12 Jul 2021 09:49    TPro  7.5  /  Alb  3.6  /  TBili  0.3  /  DBili  x   /  AST  44<H>  /  ALT  76  /  AlkPhos  125<H>  07-12          RADIOLOGY & ADDITIONAL STUDIES:      CT head 7/10/21:  No acute intracranial hemorrhage or mass effect.    CT perfusion, CTA head and neck 7/10/21:  CT brain perfusion: No ischemic penumbra.    CTA head and neck: Patent cervical and intracranial major arterial vasculature without evidence of abrupt vessel cut off, hemodynamically significant stenosis, aneurysm, or dissection.    MR head with and without contrast 7/11/21:  Small developmental venous anomaly, no evidence acute hemorrhage, mass or mass effect is seen.    EEG 7/12/21: normal        
CC: Facial droop and Left sided weakness (11 Jul 2021 08:31)    HPI:  45 year old Female with remote history of breast cancer (reportedly in remission with no history of metastasis to brain) with 1 hour onset(before coming to the ER) of left sided lower facial droop (sparing the forehead).  Daughter was with patient at time to confirm time of onset.  ED physician gives patient NIHSS 3.  2 for face, 1 for weakness in arm. During my eval patient still has the left sided facial droop, left upper ext weakness and also has some weakness in the left lower extremity. No prior h/o CVA. No other complain today.      INTERVAL HPI/ OVERNIGHT EVENTS:  chart reviewed, Pt was seen and examined, reports still with some L sided numbness, L sided arm weakness more then usual, states that has chronic mild weakness from breast CA/lymphedema as well as chronic numbness L foot from chemo.  No speech issues, was able to ambulate with PT. Cleared by  S$S for regular texture diet     Vital Signs Last 24 Hrs  T(C): 36.5 (11 Jul 2021 17:23), Max: 37.2 (11 Jul 2021 03:20)  T(F): 97.7 (11 Jul 2021 17:23), Max: 98.9 (11 Jul 2021 03:20)  HR: 95 (11 Jul 2021 17:23) (83 - 104)  BP: 114/66 (11 Jul 2021 17:23) (114/66 - 133/84)  BP(mean): 81 (11 Jul 2021 01:08) (81 - 87)  RR: 18 (11 Jul 2021 17:23) (12 - 18)  SpO2: 97% (11 Jul 2021 17:23) (96% - 99%)    REVIEW OF SYSTEMS:  All other review of systems is negative unless indicated above.      PHYSICAL EXAM:  General: Well developed;  in no acute distress  Eyes: PERRLA, EOMI; conjunctiva and sclera clear  Head: Normocephalic; atraumatic  ENMT: No nasal discharge; airway clear  Neck: Supple; non tender; no masses  Respiratory: No wheezes, rales or rhonchi  Cardiovascular: Regular rate and rhythm. S1 and S2 Normal;   Gastrointestinal: Soft non-tender non-distended; Normal bowel sounds  Genitourinary: No  suprapubic  tenderness  Extremities:  No  edema  Vascular: Peripheral pulses palpable 2+ bilaterally  Neurological: Alert and oriented x3, + L facial droop, speech clear, min L arm weakness.   Skin: Warm and dry. No acute rash  Musculoskeletal: Normal muscle tone, no joint  effusion   Psychiatric: Cooperative      LABS:   CARDIAC MARKERS ( 10 Jul 2021 20:48 )  <0.015 ng/mL / x     / x     / x     / x                                13.2   10.30 )-----------( 355      ( 10 Jul 2021 20:48 )             39.7     10 Jul 2021 20:48    135    |  102    |  16     ----------------------------<  90     3.9     |  28     |  1.27     Ca    9.8        10 Jul 2021 20:48    TPro  8.3    /  Alb  4.2    /  TBili  0.3    /  DBili  x      /  AST  65     /  ALT  91     /  AlkPhos  148    10 Jul 2021 20:48    PT/INR - ( 10 Jul 2021 20:48 )   PT: 11.9 sec;   INR: 1.03 ratio         PTT - ( 10 Jul 2021 20:48 )  PTT:32.3 sec      CAPILLARY BLOOD GLUCOSE  POCT Blood Glucose.: 105 mg/dL (11 Jul 2021 07:52)  POCT Blood Glucose.: 94 mg/dL (10 Jul 2021 20:41)    LIVER FUNCTIONS - ( 10 Jul 2021 20:48 )  Alb: 4.2 g/dL / Pro: 8.3 gm/dL / ALK PHOS: 148 U/L / ALT: 91 U/L / AST: 65 U/L / GGT: x             MEDICATIONS  (STANDING):  aspirin enteric coated 81 milliGRAM(s) Oral daily  buPROPion XL (24-Hour) . 150 milliGRAM(s) Oral daily  venlafaxine XR. 150 milliGRAM(s) Oral at bedtime    MEDICATIONS  (PRN):  hydrOXYzine hydrochloride 50 milliGRAM(s) Oral two times a day PRN Anxiety  ondansetron Injectable 4 milliGRAM(s) IV Push every 8 hours PRN Nausea and/or Vomiting        RADIOLOGY & ADDITIONAL TESTS:    < from: CT Angio Neck w/ IV Cont (07.10.21 @ 21:05) >    EXAM:  CT ANGIO BRAIN (W)AW IC                          EXAM:  CT ANGIO NECK (W)AW IC                          EXAM:  CT PERFUSION W MAPS IC                            PROCEDURE DATE:  07/10/2021      < from: CT Angio Neck w/ IV Cont (07.10.21 @ 21:05) >    IMPRESSION:  CT brain perfusion: No ischemic penumbra.    CTA head and neck: Patent cervical and intracranial major arterial vasculature without evidence of abrupt vessel cut off, hemodynamically significant stenosis, aneurysm, or dissection.    
CC: Facial droop and Left sided weakness (11 Jul 2021 08:31)    HPI:  45 year old Female with remote history of breast cancer (reportedly in remission with no history of metastasis to brain) with 1 hour onset(before coming to the ER) of left sided lower facial droop (sparing the forehead).  Daughter was with patient at time to confirm time of onset.  ED physician gives patient NIHSS 3.  2 for face, 1 for weakness in arm. During my eval patient still has the left sided facial droop, left upper ext weakness and also has some weakness in the left lower extremity. No prior h/o CVA. No other complain today.      INTERVAL HPI/ OVERNIGHT EVENTS:  Pt was seen and examined,  c/o feeling that L face and lip numbness is worse, also had similar episode yesterday got better later. L arm and leg with no changes  Also did complained to RN to have some HA. Results of MRI discussed with PT, plan for EEG as per neuro     Vital Signs Last 24 Hrs  T(C): 36.5 (12 Jul 2021 12:28), Max: 37.1 (11 Jul 2021 23:13)  T(F): 97.7 (12 Jul 2021 12:28), Max: 98.7 (11 Jul 2021 23:13)  HR: 96 (12 Jul 2021 12:28) (95 - 96)  BP: 117/77 (12 Jul 2021 12:28) (112/66 - 117/77)  RR: 17 (12 Jul 2021 12:28) (17 - 18)  SpO2: 97% (12 Jul 2021 12:28) (96% - 97%)        REVIEW OF SYSTEMS:  All other review of systems is negative unless indicated above.      PHYSICAL EXAM:  General: Well developed;  in no acute distress  Eyes: PERRLA, EOMI; conjunctiva and sclera clear  Head: Normocephalic; atraumatic  ENMT: No nasal discharge; airway clear  Neck: Supple; non tender; no masses  Respiratory: No wheezes, rales or rhonchi  Cardiovascular: Regular rate and rhythm. S1 and S2 Normal;   Gastrointestinal: Soft non-tender non-distended; Normal bowel sounds  Genitourinary: No  suprapubic  tenderness  Extremities:  No  edema  Vascular: Peripheral pulses palpable 2+ bilaterally  Neurological: Alert and oriented x3, + L facial droop, speech clear, min L arm weakness  Skin: Warm and dry. No acute rash  Musculoskeletal: Normal muscle tone, no joint  effusion   Psychiatric: Cooperative      LABS:                           12.1   5.80  )-----------( 275      ( 12 Jul 2021 09:49 )             38.2     07-12    140  |  107  |  13  ----------------------------<  151<H>  4.2   |  28  |  0.81    Ca    9.3      12 Jul 2021 09:49    TPro  7.5  /  Alb  3.6  /  TBili  0.3  /  DBili  x   /  AST  44<H>  /  ALT  76  /  AlkPhos  125<H>  07-12    CARDIAC MARKERS ( 10 Jul 2021 20:48 )  <0.015 ng/mL / x     / x     / x     / x          LIVER FUNCTIONS - ( 12 Jul 2021 09:49 )  Alb: 3.6 g/dL / Pro: 7.5 g/dL / ALK PHOS: 125 U/L / ALT: 76 U/L / AST: 44 U/L / GGT: x           PT/INR - ( 10 Jul 2021 20:48 )   PT: 11.9 sec;   INR: 1.03 ratio   PTT - ( 10 Jul 2021 20:48 )  PTT:32.3 sec        CARDIAC MARKERS ( 10 Jul 2021 20:48 )  <0.015 ng/mL / x     / x     / x     / x                                13.2   10.30 )-----------( 355      ( 10 Jul 2021 20:48 )             39.7     10 Jul 2021 20:48    135    |  102    |  16     ----------------------------<  90     3.9     |  28     |  1.27     Ca    9.8        10 Jul 2021 20:48    TPro  8.3    /  Alb  4.2    /  TBili  0.3    /  DBili  x      /  AST  65     /  ALT  91     /  AlkPhos  148    10 Jul 2021 20:48    PT/INR - ( 10 Jul 2021 20:48 )   PT: 11.9 sec;   INR: 1.03 ratio    PTT - ( 10 Jul 2021 20:48 )  PTT:32.3 sec        LIVER FUNCTIONS - ( 10 Jul 2021 20:48 )  Alb: 4.2 g/dL / Pro: 8.3 gm/dL / ALK PHOS: 148 U/L / ALT: 91 U/L / AST: 65 U/L / GGT: x             MEDICATIONS  (STANDING):  aspirin enteric coated 81 milliGRAM(s) Oral daily  buPROPion XL (24-Hour) . 150 milliGRAM(s) Oral daily  venlafaxine XR. 150 milliGRAM(s) Oral at bedtime    MEDICATIONS  (PRN):  hydrOXYzine hydrochloride 50 milliGRAM(s) Oral two times a day PRN Anxiety  ondansetron Injectable 4 milliGRAM(s) IV Push every 8 hours PRN Nausea and/or Vomiting        RADIOLOGY & ADDITIONAL TESTS:        EXAM:  CT ANGIO BRAIN (W)AW IC                          EXAM:  CT ANGIO NECK (W)AW IC                          EXAM:  CT PERFUSION W MAPS IC                            PROCEDURE DATE:  07/10/2021      < from: CT Angio Neck w/ IV Cont (07.10.21 @ 21:05) >    IMPRESSION:  CT brain perfusion: No ischemic penumbra.    CTA head and neck: Patent cervical and intracranial major arterial vasculature without evidence of abrupt vessel cut off, hemodynamically significant stenosis, aneurysm, or dissection.        EXAM:  MR BRAIN WAW IC                       PROCEDURE DATE:  07/11/2021          INTERPRETATION:  Clinical indications: Right-sided weakness. History of breast cancer    MRI of the brain was performed using sagittal T1, axial T1 T2 T2 FLAIR diffusion and susceptibility weighted sequence. The patient was injected with approximately 9 cc of Gadavist IV with 1 cc of contrast discarded. Sagittal coronal and axial T1-weighted sequences were performed.    This exam is compared with prior noncontrast head CT performed on July 10, 2021.    The lateral ventricles have a normal configuration    There is no evidence acute hemorrhage mass mass effect or abnormal enhancement in the posterior fossa or supratentorial.    Small developmental venous anomaly seen involving the anterior right corona radiata region.    Evaluation of the diffusion weighted sequence demonstrates no abnormal areas of restricted diffusion to suggest acute infarct.    The large vessels demonstrate normal flow voids    Thevisualized paranasal sinuses mastoid and middle ear regions appear clear.    IMPRESSION: Small developmental venous anomaly, no evidence acute hemorrhage, mass or mass effect is seen.            EXAM:  US ABDOMEN RT UPR QUADRANT                        PROCEDURE DATE:  07/12/2021      FINDINGS:  Liver: Increased echogenicity.  Bile ducts: Normal caliber. Common bile duct measures 3 mm.  Gallbladder: Within normal limits.  Pancreas: Visualized portions are within normal limits.  Right kidney: 10.0 cm. No hydronephrosis.  Ascites: None.  IVC: Visualized portions are within normal limits.    IMPRESSION:    No gallstones or biliary dilatation.  Fatty liver.      
Interval History:  7/12/21: No new events. States that face feels a little better but     MEDICATIONS  (STANDING):  aspirin enteric coated 81 milliGRAM(s) Oral daily  buPROPion XL (24-Hour) . 150 milliGRAM(s) Oral daily  letrozole 2.5 milliGRAM(s) Oral daily  venlafaxine XR. 150 milliGRAM(s) Oral at bedtime    MEDICATIONS  (PRN):  hydrOXYzine hydrochloride 50 milliGRAM(s) Oral two times a day PRN Anxiety  ondansetron Injectable 4 milliGRAM(s) IV Push every 8 hours PRN Nausea and/or Vomiting      Allergies    Erythromycin Base (Unknown)  strawberry (Unknown)  vancomycin (Hives)    Intolerances        PHYSICAL EXAM:  Vital Signs Last 24 Hrs  T(F): 98.7 (07-11-21 @ 23:13)  HR: 95 (07-11-21 @ 23:13)  BP: 112/66 (07-11-21 @ 23:13)  RR: 18 (07-11-21 @ 23:13)    GENERAL: NAD, well-groomed, well-developed  HEAD:  Atraumatic, Normocephalic  Neuro:  Awake, alert, no aphasia  CN: PERRL, EOMI, no nystagmus, very subtle left facial weakness, decreased sensation in left face, tongue protrudes in the midline  motor: normal tone, no pronator drift, very mild weakness of LUE on confrontation testing  sensory: decreased sensation over left arm and leg  coordination: finger to nose intact bilaterally  DTRs: symmetric, plantar responses flexor bilaterally    LABS:                        13.2   10.30 )-----------( 355      ( 10 Jul 2021 20:48 )             39.7     07-10    135  |  102  |  16  ----------------------------<  90  3.9   |  28  |  1.27    Ca    9.8      10 Jul 2021 20:48    TPro  8.3  /  Alb  4.2  /  TBili  0.3  /  DBili  x   /  AST  65<H>  /  ALT  91<H>  /  AlkPhos  148<H>  07-10    PT/INR - ( 10 Jul 2021 20:48 )   PT: 11.9 sec;   INR: 1.03 ratio         PTT - ( 10 Jul 2021 20:48 )  PTT:32.3 sec      RADIOLOGY & ADDITIONAL STUDIES:    CT head 7/10/21:  No acute intracranial hemorrhage or mass effect.    CT perfusion, CTA head and neck 7/10/21:  CT brain perfusion: No ischemic penumbra.    CTA head and neck: Patent cervical and intracranial major arterial vasculature without evidence of abrupt vessel cut off, hemodynamically significant stenosis, aneurysm, or dissection.    MR head with and without contrast 7/11/21:  Small developmental venous anomaly, no evidence acute hemorrhage, mass or mass effect is seen.

## 2021-07-19 DIAGNOSIS — G43.109 MIGRAINE WITH AURA, NOT INTRACTABLE, WITHOUT STATUS MIGRAINOSUS: ICD-10-CM

## 2021-07-19 DIAGNOSIS — Z90.12 ACQUIRED ABSENCE OF LEFT BREAST AND NIPPLE: ICD-10-CM

## 2021-07-19 DIAGNOSIS — G81.94 HEMIPLEGIA, UNSPECIFIED AFFECTING LEFT NONDOMINANT SIDE: ICD-10-CM

## 2021-07-19 DIAGNOSIS — Z88.1 ALLERGY STATUS TO OTHER ANTIBIOTIC AGENTS STATUS: ICD-10-CM

## 2021-07-19 DIAGNOSIS — Z91.018 ALLERGY TO OTHER FOODS: ICD-10-CM

## 2021-07-19 DIAGNOSIS — R29.810 FACIAL WEAKNESS: ICD-10-CM

## 2021-07-19 DIAGNOSIS — E78.5 HYPERLIPIDEMIA, UNSPECIFIED: ICD-10-CM

## 2021-07-19 DIAGNOSIS — R79.89 OTHER SPECIFIED ABNORMAL FINDINGS OF BLOOD CHEMISTRY: ICD-10-CM

## 2021-07-19 DIAGNOSIS — F41.9 ANXIETY DISORDER, UNSPECIFIED: ICD-10-CM

## 2021-07-19 DIAGNOSIS — Z92.21 PERSONAL HISTORY OF ANTINEOPLASTIC CHEMOTHERAPY: ICD-10-CM

## 2021-07-19 DIAGNOSIS — Z85.3 PERSONAL HISTORY OF MALIGNANT NEOPLASM OF BREAST: ICD-10-CM

## 2021-07-19 DIAGNOSIS — R20.0 ANESTHESIA OF SKIN: ICD-10-CM

## 2021-07-23 ENCOUNTER — APPOINTMENT (OUTPATIENT)
Dept: NEUROLOGY | Facility: CLINIC | Age: 46
End: 2021-07-23
Payer: COMMERCIAL

## 2021-07-23 VITALS
DIASTOLIC BLOOD PRESSURE: 81 MMHG | HEIGHT: 69 IN | BODY MASS INDEX: 29.62 KG/M2 | HEART RATE: 98 BPM | SYSTOLIC BLOOD PRESSURE: 122 MMHG | WEIGHT: 200 LBS | TEMPERATURE: 97.2 F

## 2021-07-23 DIAGNOSIS — R29.810 FACIAL WEAKNESS: ICD-10-CM

## 2021-07-23 DIAGNOSIS — R20.2 PARESTHESIA OF SKIN: ICD-10-CM

## 2021-07-23 PROCEDURE — 99072 ADDL SUPL MATRL&STAF TM PHE: CPT

## 2021-07-23 PROCEDURE — 99495 TRANSJ CARE MGMT MOD F2F 14D: CPT

## 2021-07-23 RX ORDER — ATORVASTATIN CALCIUM 20 MG/1
20 TABLET, FILM COATED ORAL
Refills: 0 | Status: ACTIVE | COMMUNITY

## 2021-07-23 RX ORDER — LETROZOLE TABLETS 2.5 MG/1
2.5 TABLET, FILM COATED ORAL
Refills: 0 | Status: ACTIVE | COMMUNITY

## 2021-07-23 RX ORDER — ASPIRIN 81 MG
81 TABLET, DELAYED RELEASE (ENTERIC COATED) ORAL
Refills: 0 | Status: ACTIVE | COMMUNITY

## 2021-07-23 RX ORDER — VENLAFAXINE HYDROCHLORIDE 150 MG/1
150 TABLET, EXTENDED RELEASE ORAL
Refills: 0 | Status: ACTIVE | COMMUNITY

## 2021-07-23 RX ORDER — BUPROPION HYDROCHLORIDE 150 MG/1
150 TABLET, EXTENDED RELEASE ORAL
Refills: 0 | Status: ACTIVE | COMMUNITY

## 2021-07-23 RX ORDER — HYDROXYZINE HYDROCHLORIDE 50 MG/1
50 TABLET ORAL
Refills: 0 | Status: ACTIVE | COMMUNITY

## 2021-07-23 NOTE — DISCUSSION/SUMMARY
[FreeTextEntry1] : Ms. Blunt is a 45 year old woman with history of breast CA 9 years ago who was  admitted to [facility] in July 2021  with sudden onset of left lower facial droop, subtle left sided weakness and subjective decreased sensation over left face and left arm and leg.\par \par -MRI brain was negative for infarct and metastatic disease\par -Patient has a developmental venous anomaly in the right corona radiata. These are typically asymptomatic but can rarely cause neurological deficits. These can sometimes be associated with cavernomas which can cause neurological symptoms. \par -EEG was normal\par -I am ordering a repeat MRI on 3 sharath magnet with GRE sequences to r/o any microhemorrhages, cavernoma. Will also look for any missed, tiny infarcts.\par -Can continue aspirin for now.\par -Additional blood tests for other causes of paresthesias.\par -Tremors may be secondary to Bupropion.\par \par Will follow up after MRI and blood tests, sooner if needed.\par

## 2021-07-23 NOTE — CONSULT LETTER
[Dear  ___] : Dear  [unfilled], [Consult Letter:] : I had the pleasure of evaluating your patient, [unfilled]. [Please see my note below.] : Please see my note below. [Consult Closing:] : Thank you very much for allowing me to participate in the care of this patient.  If you have any questions, please do not hesitate to contact me. [FreeTextEntry2] : Cal Baum [FreeTextEntry3] : Sincerely,\par \par \par Christina Rodriguez MD\par Diplomate, [employer]iat[employer] Neu[employer]\par Board Certified in the Subspecialty of Clinical Neurophysiology\par Board Certified in the Subspecialty of Sleep Medicine\par Board Certified in the Subspecialty of Epilepsy\par

## 2021-07-23 NOTE — HISTORY OF PRESENT ILLNESS
[FreeTextEntry1] : Ms. Blunt is here today for follow-up after recent hospitalization.\par She was admitted on 7/10/21 with a history of left facial droop, a sensation of left throat numbness and word finding difficulty.\par MRI of the brain showed a DVA in the right corona radiata but was otherwise normal.\par \par Interval History\par 7/23/21:\par She still has some numbness in the left side of her face but has regained motion.\par She does feel some stiffness in the left cheek.\par Sometimes she feels as if her left eye is being pulled down.\par She has noted some word finding difficulty and memory impairment.\par She has chronic numbness/tingling in her fingers/toes since chemotherapy.\par She has noticed some tremors in the right hand.\par The other day she woke up from sleep feeling as if the right side of her body was shaking and the left side of her face was being pulled down. She is not sure if it was part of a dream. When she calmed herself down she felt herself going back to normal.\par She has difficulty falling asleep but once she falls asleep she can stay asleep.\par

## 2021-07-23 NOTE — PHYSICAL EXAM
[FreeTextEntry1] : Examination:\par Constitutional: normal, no apparent distress\par Eyes: normal conjunctiva b/l, no ptosis, visual fields full\par Respiratory: no respiratory distress, normal effort, normal auscultation\par Cardiovascular: normal rate, rhythm, no murmurs\par Neck: supple, no masses\par Vascular: carotids normal\par Skin: normal color, no rashes\par Psych: normal mood, affect\par \par Neurological:\par Memory: normal memory, oriented to person, place, time\par Language intact/no aphasia\par Cranial Nerves: II-XII intact, Pupils equally round and reactive to light, ocular muscles/movements intact, no ptosis, no facial weakness, tongue protrudes normally in the midline, \par Motor: normal tone, no pronator drift, full strength in all four extremities in the proximal and distal muscle groups\par Coordination: Fine motor movements intact, rapid alternating movements intact, finger to nose intact bilaterally\par Sensory: subjective decreased sensation in left face, arm and leg.\par DTRs: symmetric, 2+ in b/l biceps, triceps, radialis, patellar, ankles\par Gait: narrow based, steady\par

## 2021-07-23 NOTE — DATA REVIEWED
[de-identified] : MR head with and without contrast 7/11/21:\par Small developmental venous anomaly, no evidence acute hemorrhage, mass or mass effect is seen.\par  [de-identified] : EEG 7/12/21: normal [de-identified] : CT head 7/10/21:\par No acute intracranial hemorrhage or mass effect.\par \par CT perfusion, CTA head and neck 7/10/21:\par CT brain perfusion: No ischemic penumbra.\par \par CTA head and neck: Patent cervical and intracranial major arterial vasculature without evidence of abrupt vessel cut off, hemodynamically significant stenosis, aneurysm, or dissection.\par

## 2021-08-05 ENCOUNTER — APPOINTMENT (OUTPATIENT)
Dept: MRI IMAGING | Facility: CLINIC | Age: 46
End: 2021-08-05
Payer: COMMERCIAL

## 2021-08-05 ENCOUNTER — OUTPATIENT (OUTPATIENT)
Dept: OUTPATIENT SERVICES | Facility: HOSPITAL | Age: 46
LOS: 1 days | End: 2021-08-05
Payer: COMMERCIAL

## 2021-08-05 DIAGNOSIS — Z90.12 ACQUIRED ABSENCE OF LEFT BREAST AND NIPPLE: Chronic | ICD-10-CM

## 2021-08-05 DIAGNOSIS — R20.2 PARESTHESIA OF SKIN: ICD-10-CM

## 2021-08-05 PROCEDURE — 70551 MRI BRAIN STEM W/O DYE: CPT

## 2021-08-05 PROCEDURE — 70551 MRI BRAIN STEM W/O DYE: CPT | Mod: 26

## 2021-08-06 ENCOUNTER — NON-APPOINTMENT (OUTPATIENT)
Age: 46
End: 2021-08-06

## 2021-08-10 ENCOUNTER — NON-APPOINTMENT (OUTPATIENT)
Age: 46
End: 2021-08-10

## 2021-08-23 ENCOUNTER — TRANSCRIPTION ENCOUNTER (OUTPATIENT)
Age: 46
End: 2021-08-23

## 2021-10-08 ENCOUNTER — APPOINTMENT (OUTPATIENT)
Dept: NEUROLOGY | Facility: CLINIC | Age: 46
End: 2021-10-08

## 2022-03-12 NOTE — DISCHARGE NOTE PROVIDER - CARE PROVIDER_API CALL
Christina Rodriguez (MD)  Clinical Neurophysiology; EEGEpilepsy; Neurology; Sleep Medicine  [address] [facility], [place] [address]  Phone: [phone]  Fax: [phone]  Follow Up Time: 2 weeks   normal

## 2022-08-01 NOTE — ED ADULT NURSE NOTE - BEFAST BALANCE
Dapsone Pregnancy And Lactation Text: This medication is Pregnancy Category C and is not considered safe during pregnancy or breast feeding. Topical Clindamycin Counseling: Patient counseled that this medication may cause skin irritation or allergic reactions.  In the event of skin irritation, the patient was advised to reduce the amount of the drug applied or use it less frequently.   The patient verbalized understanding of the proper use and possible adverse effects of clindamycin.  All of the patient's questions and concerns were addressed. Doxycycline Pregnancy And Lactation Text: This medication is Pregnancy Category D and not consider safe during pregnancy. It is also excreted in breast milk but is considered safe for shorter treatment courses. Erythromycin Counseling:  I discussed with the patient the risks of erythromycin including but not limited to GI upset, allergic reaction, drug rash, diarrhea, increase in liver enzymes, and yeast infections. Tazorac Pregnancy And Lactation Text: This medication is not safe during pregnancy. It is unknown if this medication is excreted in breast milk. Doxycycline Counseling:  Patient counseled regarding possible photosensitivity and increased risk for sunburn.  Patient instructed to avoid sunlight, if possible.  When exposed to sunlight, patients should wear protective clothing, sunglasses, and sunscreen.  The patient was instructed to call the office immediately if the following severe adverse effects occur:  hearing changes, easy bruising/bleeding, severe headache, or vision changes.  The patient verbalized understanding of the proper use and possible adverse effects of doxycycline.  All of the patient's questions and concerns were addressed. Dapsone Counseling: I discussed with the patient the risks of dapsone including but not limited to hemolytic anemia, agranulocytosis, rashes, methemoglobinemia, kidney failure, peripheral neuropathy, headaches, GI upset, and liver toxicity.  Patients who start dapsone require monitoring including baseline LFTs and weekly CBCs for the first month, then every month thereafter.  The patient verbalized understanding of the proper use and possible adverse effects of dapsone.  All of the patient's questions and concerns were addressed. Aklief counseling:  Patient advised to apply a pea-sized amount only at bedtime and wait 30 minutes after washing their face before applying.  If too drying, patient may add a non-comedogenic moisturizer.  The most commonly reported side effects including irritation, redness, scaling, dryness, stinging, burning, itching, and increased risk of sunburn.  The patient verbalized understanding of the proper use and possible adverse effects of retinoids.  All of the patient's questions and concerns were addressed. Tetracycline Pregnancy And Lactation Text: This medication is Pregnancy Category D and not consider safe during pregnancy. It is also excreted in breast milk. Azithromycin Pregnancy And Lactation Text: This medication is considered safe during pregnancy and is also secreted in breast milk. High Dose Vitamin A Counseling: Side effects reviewed, pt to contact office should one occur. No Aklief Pregnancy And Lactation Text: It is unknown if this medication is safe to use during pregnancy.  It is unknown if this medication is excreted in breast milk.  Breastfeeding women should use the topical cream on the smallest area of the skin for the shortest time needed while breastfeeding.  Do not apply to nipple and areola. Bactrim Pregnancy And Lactation Text: This medication is Pregnancy Category D and is known to cause fetal risk.  It is also excreted in breast milk. Birth Control Pills Counseling: Birth Control Pill Counseling: I discussed with the patient the potential side effects of OCPs including but not limited to increased risk of stroke, heart attack, thrombophlebitis, deep venous thrombosis, hepatic adenomas, breast changes, GI upset, headaches, and depression.  The patient verbalized understanding of the proper use and possible adverse effects of OCPs. All of the patient's questions and concerns were addressed. Use Enhanced Medication Counseling?: No Topical Clindamycin Pregnancy And Lactation Text: This medication is Pregnancy Category B and is considered safe during pregnancy. It is unknown if it is excreted in breast milk. Isotretinoin Pregnancy And Lactation Text: This medication is Pregnancy Category X and is considered extremely dangerous during pregnancy. It is unknown if it is excreted in breast milk. Detail Level: Zone Winlevi Pregnancy And Lactation Text: This medication is considered safe during pregnancy and breastfeeding. Topical Sulfur Applications Counseling: Topical Sulfur Counseling: Patient counseled that this medication may cause skin irritation or allergic reactions.  In the event of skin irritation, the patient was advised to reduce the amount of the drug applied or use it less frequently.   The patient verbalized understanding of the proper use and possible adverse effects of topical sulfur application.  All of the patient's questions and concerns were addressed. High Dose Vitamin A Pregnancy And Lactation Text: High dose vitamin A therapy is contraindicated during pregnancy and breast feeding. Detail Level: Detailed Spironolactone Counseling: Patient advised regarding risks of diarrhea, abdominal pain, hyperkalemia, birth defects (for female patients), liver toxicity and renal toxicity. The patient may need blood work to monitor liver and kidney function and potassium levels while on therapy. The patient verbalized understanding of the proper use and possible adverse effects of spironolactone.  All of the patient's questions and concerns were addressed. Sarecycline Counseling: Patient advised regarding possible photosensitivity and discoloration of the teeth, skin, lips, tongue and gums.  Patient instructed to avoid sunlight, if possible.  When exposed to sunlight, patients should wear protective clothing, sunglasses, and sunscreen.  The patient was instructed to call the office immediately if the following severe adverse effects occur:  hearing changes, easy bruising/bleeding, severe headache, or vision changes.  The patient verbalized understanding of the proper use and possible adverse effects of sarecycline.  All of the patient's questions and concerns were addressed. Isotretinoin Counseling: Patient should get monthly blood tests, not donate blood, not drive at night if vision affected, not share medication, and not undergo elective surgery for 6 months after tx completed. Side effects reviewed, pt to contact office should one occur. Tazorac Counseling:  Patient advised that medication is irritating and drying.  Patient may need to apply sparingly and wash off after an hour before eventually leaving it on overnight.  The patient verbalized understanding of the proper use and possible adverse effects of tazorac.  All of the patient's questions and concerns were addressed. Spironolactone Pregnancy And Lactation Text: This medication can cause feminization of the male fetus and should be avoided during pregnancy. The active metabolite is also found in breast milk. Benzoyl Peroxide Counseling: Patient counseled that medicine may cause skin irritation and bleach clothing.  In the event of skin irritation, the patient was advised to reduce the amount of the drug applied or use it less frequently.   The patient verbalized understanding of the proper use and possible adverse effects of benzoyl peroxide.  All of the patient's questions and concerns were addressed. Benzoyl Peroxide Pregnancy And Lactation Text: This medication is Pregnancy Category C. It is unknown if benzoyl peroxide is excreted in breast milk. Bactrim Counseling:  I discussed with the patient the risks of sulfa antibiotics including but not limited to GI upset, allergic reaction, drug rash, diarrhea, dizziness, photosensitivity, and yeast infections.  Rarely, more serious reactions can occur including but not limited to aplastic anemia, agranulocytosis, methemoglobinemia, blood dyscrasias, liver or kidney failure, lung infiltrates or desquamative/blistering drug rashes. Topical Retinoid counseling:  Patient advised to apply a pea-sized amount only at bedtime and wait 30 minutes after washing their face before applying.  If too drying, patient may add a non-comedogenic moisturizer. The patient verbalized understanding of the proper use and possible adverse effects of retinoids.  All of the patient's questions and concerns were addressed. Azelaic Acid Counseling: Patient counseled that medicine may cause skin irritation and to avoid applying near the eyes.  In the event of skin irritation, the patient was advised to reduce the amount of the drug applied or use it less frequently.   The patient verbalized understanding of the proper use and possible adverse effects of azelaic acid.  All of the patient's questions and concerns were addressed. Azithromycin Counseling:  I discussed with the patient the risks of azithromycin including but not limited to GI upset, allergic reaction, drug rash, diarrhea, and yeast infections. Minocycline Counseling: Patient advised regarding possible photosensitivity and discoloration of the teeth, skin, lips, tongue and gums.  Patient instructed to avoid sunlight, if possible.  When exposed to sunlight, patients should wear protective clothing, sunglasses, and sunscreen.  The patient was instructed to call the office immediately if the following severe adverse effects occur:  hearing changes, easy bruising/bleeding, severe headache, or vision changes.  The patient verbalized understanding of the proper use and possible adverse effects of minocycline.  All of the patient's questions and concerns were addressed. Topical Sulfur Applications Pregnancy And Lactation Text: This medication is Pregnancy Category C and has an unknown safety profile during pregnancy. It is unknown if this topical medication is excreted in breast milk. Erythromycin Pregnancy And Lactation Text: This medication is Pregnancy Category B and is considered safe during pregnancy. It is also excreted in breast milk. Azelaic Acid Pregnancy And Lactation Text: This medication is considered safe during pregnancy and breast feeding. Winlevi Counseling:  I discussed with the patient the risks of topical clascoterone including but not limited to erythema, scaling, itching, and stinging. Patient voiced their understanding. Tetracycline Counseling: Patient counseled regarding possible photosensitivity and increased risk for sunburn.  Patient instructed to avoid sunlight, if possible.  When exposed to sunlight, patients should wear protective clothing, sunglasses, and sunscreen.  The patient was instructed to call the office immediately if the following severe adverse effects occur:  hearing changes, easy bruising/bleeding, severe headache, or vision changes.  The patient verbalized understanding of the proper use and possible adverse effects of tetracycline.  All of the patient's questions and concerns were addressed. Patient understands to avoid pregnancy while on therapy due to potential birth defects. Birth Control Pills Pregnancy And Lactation Text: This medication should be avoided if pregnant and for the first 30 days post-partum. Topical Retinoid Pregnancy And Lactation Text: This medication is Pregnancy Category C. It is unknown if this medication is excreted in breast milk.

## 2022-09-21 NOTE — ED PROVIDER NOTE - NS ED MD DISPO DIVISION
[facility] Island Pedicle Flap Text: The defect edges were debeveled with a #15 scalpel blade.  Given the location of the defect, shape of the defect and the proximity to free margins an island pedicle advancement flap was deemed most appropriate.  Using a sterile surgical marker, an appropriate advancement flap was drawn incorporating the defect, outlining the appropriate donor tissue and placing the expected incisions within the relaxed skin tension lines where possible.    The area thus outlined was incised deep to adipose tissue with a #15 scalpel blade.  The skin margins were undermined to an appropriate distance in all directions around the primary defect and laterally outward around the island pedicle utilizing iris scissors.  There was minimal undermining beneath the pedicle flap.

## 2024-03-19 ENCOUNTER — EMERGENCY (EMERGENCY)
Facility: HOSPITAL | Age: 49
LOS: 0 days | Discharge: HOME | End: 2024-03-20
Attending: EMERGENCY MEDICINE
Payer: COMMERCIAL

## 2024-03-19 VITALS — WEIGHT: 205.03 LBS | HEIGHT: 69 IN

## 2024-03-19 DIAGNOSIS — M54.9 DORSALGIA, UNSPECIFIED: ICD-10-CM

## 2024-03-19 DIAGNOSIS — Z88.1 ALLERGY STATUS TO OTHER ANTIBIOTIC AGENTS STATUS: ICD-10-CM

## 2024-03-19 DIAGNOSIS — R10.9 UNSPECIFIED ABDOMINAL PAIN: ICD-10-CM

## 2024-03-19 DIAGNOSIS — R30.0 DYSURIA: ICD-10-CM

## 2024-03-19 DIAGNOSIS — Z91.018 ALLERGY TO OTHER FOODS: ICD-10-CM

## 2024-03-19 DIAGNOSIS — Z90.12 ACQUIRED ABSENCE OF LEFT BREAST AND NIPPLE: Chronic | ICD-10-CM

## 2024-03-19 LAB
ALBUMIN SERPL ELPH-MCNC: 4.4 G/DL — SIGNIFICANT CHANGE UP (ref 3.3–5)
ALP SERPL-CCNC: 132 U/L — HIGH (ref 40–120)
ALT FLD-CCNC: 32 U/L — SIGNIFICANT CHANGE UP (ref 12–78)
ANION GAP SERPL CALC-SCNC: 7 MMOL/L — SIGNIFICANT CHANGE UP (ref 5–17)
APPEARANCE UR: CLEAR — SIGNIFICANT CHANGE UP
APTT BLD: 32.6 SEC — SIGNIFICANT CHANGE UP (ref 24.5–35.6)
AST SERPL-CCNC: 16 U/L — SIGNIFICANT CHANGE UP (ref 15–37)
BASOPHILS # BLD AUTO: 0.09 K/UL — SIGNIFICANT CHANGE UP (ref 0–0.2)
BASOPHILS NFR BLD AUTO: 0.9 % — SIGNIFICANT CHANGE UP (ref 0–2)
BILIRUB SERPL-MCNC: 0.4 MG/DL — SIGNIFICANT CHANGE UP (ref 0.2–1.2)
BILIRUB UR-MCNC: NEGATIVE — SIGNIFICANT CHANGE UP
BUN SERPL-MCNC: 13 MG/DL — SIGNIFICANT CHANGE UP (ref 7–23)
CALCIUM SERPL-MCNC: 9.5 MG/DL — SIGNIFICANT CHANGE UP (ref 8.5–10.1)
CHLORIDE SERPL-SCNC: 104 MMOL/L — SIGNIFICANT CHANGE UP (ref 96–108)
CO2 SERPL-SCNC: 24 MMOL/L — SIGNIFICANT CHANGE UP (ref 22–31)
COLOR SPEC: YELLOW — SIGNIFICANT CHANGE UP
CREAT SERPL-MCNC: 0.96 MG/DL — SIGNIFICANT CHANGE UP (ref 0.5–1.3)
DIFF PNL FLD: NEGATIVE — SIGNIFICANT CHANGE UP
EGFR: 72 ML/MIN/1.73M2 — SIGNIFICANT CHANGE UP
EOSINOPHIL # BLD AUTO: 0.46 K/UL — SIGNIFICANT CHANGE UP (ref 0–0.5)
EOSINOPHIL NFR BLD AUTO: 4.4 % — SIGNIFICANT CHANGE UP (ref 0–6)
GLUCOSE SERPL-MCNC: 99 MG/DL — SIGNIFICANT CHANGE UP (ref 70–99)
GLUCOSE UR QL: NEGATIVE MG/DL — SIGNIFICANT CHANGE UP
HCT VFR BLD CALC: 35.9 % — SIGNIFICANT CHANGE UP (ref 34.5–45)
HGB BLD-MCNC: 12.3 G/DL — SIGNIFICANT CHANGE UP (ref 11.5–15.5)
IMM GRANULOCYTES NFR BLD AUTO: 0.3 % — SIGNIFICANT CHANGE UP (ref 0–0.9)
INR BLD: 0.99 RATIO — SIGNIFICANT CHANGE UP (ref 0.85–1.18)
KETONES UR-MCNC: NEGATIVE MG/DL — SIGNIFICANT CHANGE UP
LACTATE SERPL-SCNC: 0.9 MMOL/L — SIGNIFICANT CHANGE UP (ref 0.7–2)
LEUKOCYTE ESTERASE UR-ACNC: NEGATIVE — SIGNIFICANT CHANGE UP
LYMPHOCYTES # BLD AUTO: 3.42 K/UL — HIGH (ref 1–3.3)
LYMPHOCYTES # BLD AUTO: 32.6 % — SIGNIFICANT CHANGE UP (ref 13–44)
MCHC RBC-ENTMCNC: 28.7 PG — SIGNIFICANT CHANGE UP (ref 27–34)
MCHC RBC-ENTMCNC: 34.3 GM/DL — SIGNIFICANT CHANGE UP (ref 32–36)
MCV RBC AUTO: 83.9 FL — SIGNIFICANT CHANGE UP (ref 80–100)
MONOCYTES # BLD AUTO: 0.65 K/UL — SIGNIFICANT CHANGE UP (ref 0–0.9)
MONOCYTES NFR BLD AUTO: 6.2 % — SIGNIFICANT CHANGE UP (ref 2–14)
NEUTROPHILS # BLD AUTO: 5.84 K/UL — SIGNIFICANT CHANGE UP (ref 1.8–7.4)
NEUTROPHILS NFR BLD AUTO: 55.6 % — SIGNIFICANT CHANGE UP (ref 43–77)
NITRITE UR-MCNC: NEGATIVE — SIGNIFICANT CHANGE UP
PH UR: 5.5 — SIGNIFICANT CHANGE UP (ref 5–8)
PLATELET # BLD AUTO: 344 K/UL — SIGNIFICANT CHANGE UP (ref 150–400)
POTASSIUM SERPL-MCNC: 3.6 MMOL/L — SIGNIFICANT CHANGE UP (ref 3.5–5.3)
POTASSIUM SERPL-SCNC: 3.6 MMOL/L — SIGNIFICANT CHANGE UP (ref 3.5–5.3)
PROT SERPL-MCNC: 7.9 GM/DL — SIGNIFICANT CHANGE UP (ref 6–8.3)
PROT UR-MCNC: NEGATIVE MG/DL — SIGNIFICANT CHANGE UP
PROTHROM AB SERPL-ACNC: 11.2 SEC — SIGNIFICANT CHANGE UP (ref 9.5–13)
RBC # BLD: 4.28 M/UL — SIGNIFICANT CHANGE UP (ref 3.8–5.2)
RBC # FLD: 13.6 % — SIGNIFICANT CHANGE UP (ref 10.3–14.5)
SODIUM SERPL-SCNC: 135 MMOL/L — SIGNIFICANT CHANGE UP (ref 135–145)
SP GR SPEC: 1 — SIGNIFICANT CHANGE UP (ref 1–1.03)
TROPONIN I, HIGH SENSITIVITY RESULT: <3 NG/L — SIGNIFICANT CHANGE UP
UROBILINOGEN FLD QL: 0.2 MG/DL — SIGNIFICANT CHANGE UP (ref 0.2–1)
WBC # BLD: 10.49 K/UL — SIGNIFICANT CHANGE UP (ref 3.8–10.5)
WBC # FLD AUTO: 10.49 K/UL — SIGNIFICANT CHANGE UP (ref 3.8–10.5)

## 2024-03-19 PROCEDURE — 96374 THER/PROPH/DIAG INJ IV PUSH: CPT | Mod: XU

## 2024-03-19 PROCEDURE — 85610 PROTHROMBIN TIME: CPT

## 2024-03-19 PROCEDURE — 36415 COLL VENOUS BLD VENIPUNCTURE: CPT

## 2024-03-19 PROCEDURE — 71045 X-RAY EXAM CHEST 1 VIEW: CPT

## 2024-03-19 PROCEDURE — 74177 CT ABD & PELVIS W/CONTRAST: CPT | Mod: MC

## 2024-03-19 PROCEDURE — 81003 URINALYSIS AUTO W/O SCOPE: CPT

## 2024-03-19 PROCEDURE — 85730 THROMBOPLASTIN TIME PARTIAL: CPT

## 2024-03-19 PROCEDURE — 87086 URINE CULTURE/COLONY COUNT: CPT

## 2024-03-19 PROCEDURE — 99284 EMERGENCY DEPT VISIT MOD MDM: CPT | Mod: 25

## 2024-03-19 PROCEDURE — 71275 CT ANGIOGRAPHY CHEST: CPT | Mod: MC

## 2024-03-19 PROCEDURE — 96375 TX/PRO/DX INJ NEW DRUG ADDON: CPT

## 2024-03-19 PROCEDURE — 87040 BLOOD CULTURE FOR BACTERIA: CPT

## 2024-03-19 PROCEDURE — 80053 COMPREHEN METABOLIC PANEL: CPT

## 2024-03-19 PROCEDURE — 74177 CT ABD & PELVIS W/CONTRAST: CPT | Mod: 26,MC

## 2024-03-19 PROCEDURE — 83605 ASSAY OF LACTIC ACID: CPT

## 2024-03-19 PROCEDURE — 85025 COMPLETE CBC W/AUTO DIFF WBC: CPT

## 2024-03-19 PROCEDURE — 99285 EMERGENCY DEPT VISIT HI MDM: CPT

## 2024-03-19 PROCEDURE — 84484 ASSAY OF TROPONIN QUANT: CPT

## 2024-03-19 PROCEDURE — 71045 X-RAY EXAM CHEST 1 VIEW: CPT | Mod: 26

## 2024-03-19 RX ORDER — MORPHINE SULFATE 50 MG/1
2 CAPSULE, EXTENDED RELEASE ORAL ONCE
Refills: 0 | Status: DISCONTINUED | OUTPATIENT
Start: 2024-03-19 | End: 2024-03-19

## 2024-03-19 RX ORDER — SODIUM CHLORIDE 9 MG/ML
2900 INJECTION INTRAMUSCULAR; INTRAVENOUS; SUBCUTANEOUS ONCE
Refills: 0 | Status: COMPLETED | OUTPATIENT
Start: 2024-03-19 | End: 2024-03-19

## 2024-03-19 RX ADMIN — SODIUM CHLORIDE 2900 MILLILITER(S): 9 INJECTION INTRAMUSCULAR; INTRAVENOUS; SUBCUTANEOUS at 23:25

## 2024-03-19 RX ADMIN — MORPHINE SULFATE 2 MILLIGRAM(S): 50 CAPSULE, EXTENDED RELEASE ORAL at 23:25

## 2024-03-19 NOTE — ED ADULT NURSE NOTE - TEMPLATE
Patient Summary Document

                             Created on: 10/01/2019



DANN ALCARAZ

External Reference #: [id number]

: 1950

Sex: Male



Demographics







                          Address                   [address] [place] [address]  [address]

 

                          Home Phone                [phone]

 

                          Preferred Language        Unknown

 

                          Marital Status            Unknown

 

                          Advent Affiliation     Unknown

 

                          Race                      Unknown

 

                                        Additional Race(s)  

 

                          Ethnic Group              Unknown





Author







                          Author                    [facility]

 

                          [facility]

 

                          Address                   Unknown

 

                          Phone                     Unavailable







Care Team Providers







                    Care Team Member Name    Role                Phone

 

                          Unavailable               Unavailable







Problems

This patient has no known problems.



Allergies, Adverse Reactions, Alerts

This patient has no known allergies or adverse reactions.



Medications

This patient has no known medications.



Encounters







             Start Date/Time    End Date/Time    Encounter Type    Admission Type    Attending [facility]       Care Department     Encounter ID

 

        2019 00:00:00    2019 00:00:00    Outpatient                    [facility]     [id number]

 

        2019-10-25 00:00:00    2019-10-25 00:00:00    Outpatient                    [facility]     [id number]

 

        2019-10-18 00:00:00    2019-10-18 00:00:00    Outpatient                    [facility]     [id number]

 

        2019-10-17 00:00:00    2019-10-17 00:00:00    Outpatient                    [facility]     [id number]

 

        2019-10-17 00:00:00    2019-10-17 00:00:00    Outpatient                    [facility]     [id number]

 

        2019-10-04 00:00:00    2019-10-04 00:00:00    Outpatient                    [facility]     [id number]

 

        2019-10-03 00:00:00    2019-10-03 00:00:00    Outpatient                    [facility]     [id number]

 

        2019 10:35:56    2019 10:35:56    Outpatient                    [facility]     [id number]

 

        2019 10:11:07    2019 10:11:07    Outpatient                    [facility]     [id number]

 

        2019 00:00:00    2019 00:00:00    Outpatient                    [facility]     [id number]

 

        2019 00:00:00    2019 00:00:00    Outpatient                    [facility]     [id number]

 

        2019 00:00:00    2019 00:00:00    Outpatient                    [facility]     [id number]

 

        2019 00:00:00    2019 00:00:00    Outpatient                    [facility]     [id number]

 

        2019 09:34:58    2019 09:34:58    Outpatient                    [facility]     [id number]

 

        2019 09:16:25    2019 09:16:25    Outpatient                    [facility]     [id number]

 

        2019 00:00:00    2019 00:00:00    Outpatient                    [facility]     [id number]

 

        2019-09-10 00:00:00    2019-09-10 00:00:00    Outpatient                    [facility]     [id number]

 

        2019 13:24:59    2019 13:24:59    Outpatient                    [facility]     [id number]

 

        2019 00:00:00    2019 00:00:00    Outpatient                    [facility]     [id number]

 

        2019 12:05:06    2019 12:05:06    Outpatient                    [facility]     [id number]

 

        2019 14:30:45    2019 14:30:45    Outpatient                    [facility]     [id number]

 

        2019 00:00:00    2019 00:00:00    Outpatient                    [facility]     [id number]

 

        2019 13:33:37    2019 13:33:37    Outpatient                    [facility]     [id number]

 

        2019 10:23:57    2019 10:23:57    Outpatient                    [facility]     [id number]

 

        2019 09:31:51    2019 09:31:51    Outpatient                    [facility]     [id number]

 

        2019 00:00:00    2019 00:00:00    Outpatient                    [facility]     [id number]

 

        2019 12:52:35    2019 12:52:35    Outpatient                    [facility]     [id number]

 

        2019 10:48:23    2019 10:48:23    Outpatient                    [facility]     [id number]

 

        2019 00:00:00    2019 00:00:00    Outpatient                    [facility]     [id number]

 

        2019 12:52:01    2019 12:52:01    Outpatient                    [facility]     [id number]

 

        2019 09:35:43    2019 09:35:43    Outpatient                    [facility]     [id number]

 

        2019 00:00:00    2019 00:00:00    Outpatient                    [facility]     [id number]

 

        2019 10:20:04    2019 10:20:04    Outpatient                    [facility]     [id number]

 

        2019 10:24:40    2019 10:24:40    Outpatient                    [facility]     [id number]

 

        2019 09:13:51    2019 09:13:51    Outpatient                    [facility]     [id number]

 

        2019 00:00:00    2019 00:00:00    Outpatient                    [facility]     [id number]

 

        2019-07-15 09:38:48    2019-07-15 09:38:48    Outpatient                    [facility]     [id number]

 

        2019 11:09:13    2019 11:09:13    Outpatient                    [facility]     [id number]

 

        2019 00:00:00    2019 00:00:00    Outpatient                    [facility]     [id number]

 

        2019 11:36:55    2019 11:36:55    Outpatient                    [facility]     [id number]

 

        2019 11:36:30    2019 11:36:30    Outpatient                    [facility]     [id number]

 

        2019 00:00:00    2019 00:00:00    Outpatient                    [facility]     [id number]

 

        2019 10:54:11    2019 10:54:11    Outpatient                    [facility]     [id number]

 

        2019 09:12:20    2019 09:12:20    Outpatient                    [facility]     [id number]

 

        2019 08:31:51    2019 08:31:51    Outpatient                    [facility]     [id number]

 

        2019 00:00:00    2019 00:00:00    Outpatient                    [facility]     [id number]

 

        2019 12:17:21    2019 12:17:21    Outpatient                    [facility]     [id number]

 

        2019 12:15:46    2019 12:15:46    Outpatient                    [facility]     [id number]

 

        2019 00:00:00    2019 00:00:00    Outpatient                    [facility]     [id number]

 

        2019-06-10 11:56:43    2019-06-10 11:56:43    Outpatient                    [facility]     [id number]

 

        2019 00:00:00    2019 00:00:00    Outpatient                    [facility]     [id number]

 

        2019 10:47:23    2019 10:47:23    Outpatient                    [facility]     [id number]

 

        2019 00:00:00    2019 00:00:00    Outpatient                    [facility]     [id number]

 

        2019 08:57:52    2019 08:57:52    Outpatient                    [facility]     [id number]

 

        2019 10:04:45    2019 10:04:45    Outpatient                    [facility]     [id number]

 

        2019 09:44:58    2019 09:44:58    Outpatient                    [facility]     [id number]

 

        2019 09:18:30    2019 09:18:30    Outpatient                    [facility]     [id number]

 

        2019 14:25:48    2019 14:25:48    Outpatient                    [facility]     [id number]

 

        2019 00:00:00    2019 00:00:00    Outpatient                    [facility]     [id number]

 

        2019-05-10 00:00:00    2019-05-10 00:00:00    Outpatient                    [facility]     [id number]

 

        2019 12:35:42    2019 12:35:42    Outpatient                    [facility]     [id number]

 

        2019 00:00:00    2019 00:00:00    Outpatient                    [facility]     [id number]

 

        2019 08:37:20    2019 08:37:20    Outpatient                    [facility]     [id number]

 

        2019 08:24:37    2019 08:24:37    Outpatient                    [facility]     [id number]

 

        2019 12:29:48    2019 12:29:48    Outpatient                    [facility]     [id number]

 

        2019 13:37:18    2019 13:37:18    Outpatient                    [facility]     [id number]

 

        2019-04-15 11:04:23    2019-04-15 11:04:23    Outpatient                    [facility]     [id number]

 

        2019 13:44:28    2019 13:44:28    Outpatient                    [facility]     [id number]

 

        2019 07:28:35    2019 07:28:35    Outpatient                    [facility]     [id number]

 

        2019 10:38:32    2019 10:38:32    Outpatient                    [facility]     [id number]

 

        2019 10:00:35    2019 10:00:35    Outpatient                    [facility]     [id number]

 

        2019 11:21:39    2019 11:21:39    Outpatient                    [facility]     [id number]

 

        2019 15:45:55    2019 15:45:55    Outpatient                    [facility]     [id number]

 

        2019 11:33:55    2019 11:33:55    Outpatient                    [facility]     [id number]

 

        2019-03-15 11:18:17    2019-03-15 11:18:17    Outpatient                    [facility]     [id number]

 

        2019 10:45:56    2019 10:45:56    Outpatient                    [facility]     [id number]

 

        2019 10:44:56    2019 10:44:56    Outpatient                    [facility]     [id number]

 

        2019 10:18:08    2019 10:18:08    Outpatient                    [facility]     [id number]

 

        2019 09:52:03    2019 09:52:03    Outpatient                    [facility]     [id number]

 

        2019 10:33:26    2019 10:33:26    Outpatient                    [facility]     [id number]

 

        2019 12:16:13    2019 12:16:13    Outpatient                    [facility]     [id number]

 

        2019 10:24:48    2019 10:24:48    Outpatient                    [facility]     [id number]

 

        2019-02-15 09:30:33    2019-02-15 09:30:33    Outpatient                    [facility]     [id number]

 

        2019 09:29:51    2019 09:29:51    Outpatient                    [facility]     [id number]

 

        2019 08:25:31    2019 08:25:31    Outpatient                    [facility]     [id number]

 

        2019 08:01:01    2019 08:01:01    Outpatient                    [facility]     [id number]

 

        2019 00:00:00    2019 00:00:00    Outpatient                    [facility]     [id number]

 

        2019 00:00:00    2019 00:00:00    Outpatient                    [facility]     [id number]

 

        2019 00:00:00    2019 00:00:00    Outpatient                    [facility]     [id number]

 

        2019 00:00:00    2019 00:00:00    Outpatient                    [facility]     [id number]

 

        2019 00:00:00    2019 00:00:00    Outpatient                    [facility]     [id number]

 

        2019 00:00:00    2019 00:00:00    Outpatient                    [facility]     [id number]

 

        2019 00:00:00    2019 00:00:00    Outpatient                    [facility]     [id number]

 

        2019 00:00:00    2019 00:00:00    Outpatient                    [facility]     [id number]

 

        2019-01-15 00:00:00    2019-01-15 00:00:00    Outpatient                    [facility]     [id number]

 

        2019-01-10 00:00:00    2019-01-10 00:00:00    Outpatient                    [facility]     [id number]

 

        2019-01-10 00:00:00    2019-01-10 00:00:00    Outpatient                    [facility]     [id number]

 

        2019 00:00:00    2019 00:00:00    Outpatient                    [facility]     [id number]

 

        2019 10:43:59    2019 10:43:59    Outpatient                    [facility]     [id number]

 

        2019 00:00:00    2019 00:00:00    Outpatient                    [facility]     [id number]

 

        2019 10:10:53    2019 10:10:53    Outpatient                    [facility]     [id number]

 

        2019 10:23:45    2019 10:23:45    Outpatient                    [facility]     [id number]

 

        2018 00:00:00    2018 00:00:00    Outpatient                    [facility]     [id number]

 

        2018 00:00:00    2018 00:00:00    Outpatient                    [facility]     [id number]

 

        2018 15:58:10    2018 15:58:10    Outpatient                    [facility]     [id number]

 

        2018 08:06:45    2018 08:06:45    Outpatient                    [facility]     [id number]

 

        2018 08:52:55    2018 08:52:55    Outpatient                    [facility]     [id number]

 

        2018 10:17:51    2018 10:17:51    Outpatient                    [facility]     [id number]

 

        2018 00:00:00    2018 00:00:00    Outpatient                    [facility]     [id number]

 

        2018 00:00:00    2018 00:00:00    Outpatient                    [facility]     [id number]

 

        2018 10:51:54    2018 10:51:54    Outpatient                    [facility]     [id number]

 

        2018 10:21:15    2018 10:21:15    Outpatient                    [facility]     [id number]

 

        2018 00:00:00    2018 00:00:00    Outpatient                    [facility]     [id number]

 

        2018 10:17:31    2018 10:17:31    Outpatient                    [facility]     [id number]

 

        2018 12:12:44    2018 12:12:44    Outpatient                    [facility]     [id number]

 

        2018 00:00:00    2018 00:00:00    Outpatient                    [facility]     [id number]

 

        2018 00:00:00    2018 00:00:00    Outpatient                    [facility]     [id number]

 

        2018 00:00:00    2018 00:00:00    Outpatient                    [facility]     [id number]

 

        2018 13:10:19    2018 13:10:19    Outpatient                    [facility]     [id number]

 

        2018 09:34:29    2018 09:34:29    Outpatient                    [facility]     [id number]

 

        2018 08:53:26    2018 08:53:26    Outpatient                    [facility]     [id number]

 

        2018-10-26 07:27:13    2018-10-26 07:27:13    Outpatient                    [facility]     [id number]

 

        2018-10-25 11:33:10    2018-10-25 11:33:10    Outpatient                    [facility]     [id number]

 

        2018-10-25 00:00:00    2018-10-25 00:00:00    Outpatient                    [facility]     [id number]

 

        2018-10-24 00:00:00    2018-10-24 00:00:00    Outpatient                    [facility]     [id number]

 

        2018-10-19 00:00:00    2018-10-19 00:00:00    Outpatient                    [facility]     [id number]

 

        2018-10-18 00:00:00    2018-10-18 00:00:00    Outpatient                    [facility]     [id number]

 

        2018-10-17 00:00:00    2018-10-17 00:00:00    Outpatient                    [facility]     [id number]

 

        2018-10-12 11:23:40    2018-10-12 11:23:40    Outpatient                    [facility]     [id number]

 

        2018-10-11 09:52:18    2018-10-11 09:52:18    Outpatient                    [facility]     [id number]

 

        2018-10-11 00:00:00    2018-10-11 00:00:00    Outpatient                    [facility]     [id number]

 

        2018-10-10 10:07:27    2018-10-10 10:07:27    Outpatient                    [facility]     [id number]

 

        2018-10-03 00:00:00    2018-10-03 00:00:00    Outpatient                    [facility]     [id number]

 

        2018-10-02 09:57:02    2018-10-02 09:57:02    Outpatient                    [facility]     [id number]

 

        2018-10-01 12:09:02    2018-10-01 12:09:02    Outpatient                    [facility]     [id number]

 

        2018 00:00:00    2018 00:00:00    Outpatient                    [facility]     [id number]

 

        2018 00:00:00    2018 00:00:00    Outpatient                    [facility]     [id number]

 

        2018 00:00:00    2018 00:00:00    Outpatient                    [facility]     [id number]

 

        2018 09:51:06    2018 09:51:06    Outpatient                    [facility]     [id number]

 

        2018 08:16:19    2018 08:16:19    Outpatient                    [facility]     [id number]

 

        2018 07:09:54    2018 07:09:54    Outpatient                    [facility]     [id number]

 

        2018 11:32:47    2018 11:32:47    Outpatient                    [facility]     [id number]

 

        2018 11:04:49    2018 11:04:49    Outpatient                    [facility]     [id number]

 

        2018 10:46:19    2018 10:46:19    Outpatient                    [facility]     [id number]

 

        2018 10:16:08    2018 10:16:08    Outpatient                    [facility]     [id number]

 

        2018 08:54:34    2018 08:54:34    Outpatient                    [facility]     [id number]

 

        2018 08:18:17    2018 08:18:17    Outpatient                    [facility]     [id number]

 

        2018 09:26:49    2018 09:26:49    Outpatient                    [facility]     [id number]

 

        2018 10:48:11    2018 10:48:11    Outpatient                    [facility]     [id number]

 

        2018 09:56:50    2018 09:56:50    Outpatient                    [facility]     [id number]

 

        2018 00:00:00    2018 00:00:00    Outpatient                    [facility]     [id number]

 

        2018 00:00:00    2018 00:00:00    Outpatient                    [facility]     [id number]

 

        2018 10:04:36    2018 10:04:36    Outpatient                    [facility]     [id number]

 

        2018 09:35:04    2018 09:35:04    Outpatient                    [facility]     [id number]

 

        2018-07-10 09:02:36    2018-07-10 09:02:36    Outpatient                    [facility]     [id number]

 

        2018 10:48:37    2018 10:48:37    Outpatient                    [facility]     [id number]

 

        2018 10:05:17    2018 10:05:17    Outpatient                    [facility]     [id number]

 

        2018 10:21:53    2018 10:21:53    Outpatient                    [facility]     [id number]

 

        2018 09:16:30    2018 09:16:30    Outpatient                    [facility]     [id number]

 

        2018 00:00:00    2018 00:00:00    Outpatient                    [facility]     [id number]

 

        2018 00:00:00    2018 00:00:00    Outpatient                    [facility]     [id number]

 

        2018 00:00:00    2018 00:00:00    Outpatient                    [facility]     [id number]

 

        2018 13:22:19    2018 13:22:19    Outpatient                    [facility]     [id number]

 

        2018 09:57:57    2018 09:57:57    Outpatient                    [facility]     [id number]

 

        2018 00:00:00    2018 00:00:00    Outpatient                    [facility]     [id number]

 

        2018 15:22:32    2018 15:22:32    Outpatient                    [facility]     [id number]

 

        2018 13:00:14    2018 13:00:14    Outpatient                    [facility]     [id number]

 

        2018-05-15 10:32:27    2018-05-15 10:32:27    Outpatient                    [facility]     [id number]

 

        2018 09:28:01    2018 09:28:01    Outpatient                    [facility]     [id number]

 

        2018-05-10 10:05:22    2018-05-10 10:05:22    Outpatient                    [facility]     [id number]

 

        2018-05-10 09:18:27    2018-05-10 09:18:27    Outpatient                    [facility]     [id number]

 

        2018 00:00:00    2018 00:00:00    Outpatient                    [facility]     [id number]

 

        2018 09:09:09    2018 09:09:09    Outpatient                    [facility]     [id number]

 

        2018 10:41:14    2018 10:41:14    Outpatient                    [facility]     [id number]

 

        2018 00:00:00    2018 00:00:00    Outpatient                    [facility]     [id number]

 

        2018 00:00:00    2018 00:00:00    Outpatient                    [facility]     [id number]

 

        2018 00:00:00    2018 00:00:00    Outpatient                    [facility]     [id number]

 

        2018 09:23:56    2018 09:23:56    Outpatient                    [facility]     [id number]

 

        2018 09:56:10    2018 09:56:10    Outpatient                    [facility]     [id number]

 

        2018 09:20:23    2018 09:20:23    Outpatient                    [facility]     [id number]

 

        2018 00:00:00    2018 00:00:00    Outpatient                    [facility]     [id number]

 

        2018 00:00:00    2018 00:00:00    Outpatient                    [facility]     [id number]

 

        2018 09:23:29    2018 09:23:29    Outpatient                    [facility]     [id number]

 

        2018 12:33:16    2018 12:33:16    Outpatient                    [facility]     [id number]

 

        2018 00:00:00    2018 00:00:00    Outpatient                    [facility]     [id number]

 

        2018 00:00:00    2018 00:00:00    Outpatient                    [facility]     [id number]

 

        2018 00:00:00    2018 00:00:00    Outpatient                    [facility]     [id number]

 

        2018 00:00:00    2018 00:00:00    Outpatient                    [facility]     [id number]

 

        2018 00:00:00    2018 00:00:00    Outpatient                    [facility]     [id number]

 

        2018 11:07:03    2018 11:07:03    Outpatient                    [facility]     [id number]

 

        2018 11:05:59    2018 11:05:59    Outpatient                    [facility]     [id number]

 

        2018 00:00:00    2018 00:00:00    Outpatient                    [facility]     [id number]

 

        2018 10:31:44    2018 10:31:44    Outpatient                    [facility]     [id number]

 

        2018 08:46:13    2018 08:46:13    Outpatient                    [facility]     [id number]

 

        2018 08:07:13    2018 08:07:13    Outpatient                    [facility]     [id number]

 

        2018 00:00:00    2018 00:00:00    Outpatient                    [facility]     [id number]

 

        2018 09:43:00    2018 09:43:00    Outpatient                    [facility]     [id number]

 

        2018 11:58:25    2018 11:58:25    Outpatient                    [facility]     [id number]

 

        2018 10:09:53    2018 10:09:53    Outpatient                    [facility]     [id number]

 

        2018 10:34:24    2018 10:34:24    Outpatient                    [facility]     [id number]

 

        2018 09:12:10    2018 09:12:10    Outpatient                    [facility]     [id number]

 

        2018 09:25:17    2018 09:25:17    Outpatient                    [facility]     [id number]

 

        2018 14:36:20    2018 14:36:20    Outpatient                    [facility]     [id number]

 

        2018 00:00:00    2018 00:00:00    Outpatient                    [facility]     [id number]

 

        2018 00:00:00    2018 00:00:00    Outpatient                    [facility]     [id number]

 

        2018 10:51:16    2018 10:51:16    Outpatient                    [facility]     [id number]

 

        2018 10:39:43    2018 10:39:43    Outpatient                    [facility]     [id number]

 

        2018 13:41:40    2018 13:41:40    Outpatient                    [facility]     [id number]

 

        2017 10:05:12    2017 10:05:12    Outpatient                    [facility]     [id number]

 

        2017 10:25:06    2017 10:25:06    Outpatient                    [facility]     [id number]

 

        2017 07:42:11    2017 07:42:11    Outpatient                    [facility]     [id number]

 

        2017 10:16:06    2017 10:16:06    Outpatient                    [facility]     [id number]

 

        2017 09:34:27    2017 09:34:27    Outpatient                    [facility]     [id number]

 

        2017 09:07:16    2017 09:07:16    Outpatient                    [facility]     [id number]

 

        2017 08:54:22    2017 08:54:22    Outpatient                    [facility]     [id number]

 

        2017 00:00:00    2017 00:00:00    Outpatient                    [facility]     [id number]

 

        2017 09:59:49    2017 09:59:49    Outpatient                    [facility]     [id number]

 

        2017 10:35:32    2017 10:35:32    Outpatient                    [facility]     [id number]

 

        2017 10:18:10    2017 10:18:10    Outpatient                    [facility]     [id number]

 

        2017 10:05:25    2017 10:05:25    Outpatient                    [facility]     [id number]

 

        2017 12:28:09    2017 12:28:09    Outpatient                    [facility]     [id number]

 

        2017 16:07:58    2017 16:07:58    Outpatient                    [facility]     [id number]

 

        2017-10-24 11:27:18    2017-10-24 11:27:18    Outpatient                    [facility]     [id number]

 

        2017-10-23 14:00:12    2017-10-23 14:00:12    Outpatient                    [facility]     [id number]

 

        2017-10-19 09:39:39    2017-10-19 09:39:39    Outpatient                    [facility]     [id number]

 

        2017-10-19 09:00:34    2017-10-19 09:00:34    Outpatient                    [facility]     [id number]

 

        2017-10-10 09:18:35    2017-10-10 09:18:35    Outpatient                    [facility]     [id number]

 

        2017-10-09 10:54:28    2017-10-09 10:54:28    Outpatient                    [facility]     [id number]

 

        2017 09:16:43    2017 09:16:43    Outpatient                    [facility]     [id number]

 

        2017 12:19:07    2017 12:19:07    Outpatient                    [facility]     [id number]

 

        2017 09:30:45    2017 09:30:45    Outpatient                    [facility]     [id number]

 

        2017 00:00:00    2017 00:00:00    Outpatient                    [facility]     [id number]

 

        2017 07:30:33    2017 07:30:33    Outpatient                    [facility]     [id number]

 

        2017 09:15:12    2017 09:15:12    Outpatient                    [facility]     [id number]

 

        2017 12:13:00    2017 12:13:00    Outpatient                    [facility]     [id number]

 

        2017 00:00:00    2017 00:00:00    Outpatient                    [facility]     [id number]

 

        2017 00:00:00    2017 00:00:00    Outpatient                    [facility]     [id number]

 

        2017 00:00:00    2017 00:00:00    Outpatient                    [facility]     [id number]

 

        2017 10:56:13    2017 10:56:13    Outpatient                    [facility]     [id number]

 

        2017 10:25:14    2017 10:25:14    Outpatient                    [facility]     [id number]

 

        2017 00:00:00    2017 00:00:00    Outpatient                    [facility]     [id number]

 

        2017 00:00:00    2017 00:00:00    Outpatient                    [facility]     [id number]

 

        2017-08-15 08:37:03    2017-08-15 08:37:03    Outpatient                    [facility]     [id number]

 

        2017 11:06:39    2017 11:06:39    Outpatient                    [facility]     [id number]

 

        2017 00:00:00    2017 00:00:00    Outpatient                    [facility]     [id number]

 

        2017 10:57:32    2017 10:57:32    Outpatient                    [facility]     [id number]

 

        2017 11:27:27    2017 11:27:27    Outpatient                    [facility]     [id number]

 

        2017 00:00:00    2017 00:00:00    Outpatient                    [facility]     [id number]

 

        2017 10:17:59    2017 10:17:59    Outpatient                    [facility]     [id number]

 

        2017 08:18:14    2017 08:18:14    Outpatient                    [facility]     [id number]

 

        2017 10:33:08    2017 10:33:08    Outpatient                    [facility]     [id number]

 

        2017 08:33:46    2017 08:33:46    Outpatient                    [facility]     [id number]

 

        2017 07:27:41    2017 07:27:41    Outpatient                    [facility]     [id number]

 

        2017 10:07:58    2017 10:07:58    Outpatient                    [facility]     [id number]

 

        2017 00:00:00    2017 00:00:00    Outpatient                    [facility]     [id number]

 

        2017 09:34:59    2017 09:34:59    Outpatient                    [facility]     [id number]

 

        2017-07-10 11:11:16    2017-07-10 11:11:16    Outpatient                    [facility]     [id number]

 

        2017 08:03:04    2017 08:03:04    Outpatient                    [facility]     [id number]

 

        2017 10:20:00    2017 10:20:00    Outpatient                    [facility]     [id number]

 

        2017 08:48:39    2017 08:48:39    Outpatient                    [facility]     [id number]

 

        2017 08:48:30    2017 08:48:30    Outpatient                    [facility]     [id number]

 

        2017 10:12:09    2017 10:12:09    Outpatient                    [facility]     [id number]

 

        2017 11:24:35    2017 11:24:35    Outpatient                    [facility]     [id number]

 

        2017 00:00:00    2017 00:00:00    Outpatient                    [facility]     [id number]

 

        2017 00:00:00    2017 00:00:00    Outpatient                    [facility]     [id number]

 

        2017 08:20:14    2017 08:20:14    Outpatient                    [facility]     [id number]

 

        2017 08:35:48    2017 08:35:48    Outpatient                    [facility]     [id number]

 

        2017 09:33:23    2017 09:33:23    Outpatient                    [facility]     [id number]

 

        2017 12:23:50    2017 12:23:50    Outpatient                    [facility]     [id number]

 

        2017 09:19:17    2017 09:19:17    Outpatient                    [facility]     [id number]

 

        2017 13:18:06    2017 13:18:06    Outpatient                    [facility]     [id number]

 

        2017 11:03:47    2017 11:03:47    Outpatient                    [facility]     [id number]

 

        2017 09:27:56    2017 09:27:56    Outpatient                    [facility]     [id number]

 

        2017 10:05:11    2017 10:05:11    Outpatient                    [facility]     [id number]

 

        2017 09:40:35    2017 09:40:35    Outpatient                    [facility]     [id number]

 

        2017 10:54:33    2017 10:54:33    Outpatient                    [facility]     [id number]

 

        2017 15:58:38    2017 15:58:38    Outpatient                    [facility]     [id number]

 

        2017 12:59:05    2017 12:59:05    Outpatient                    [facility]     [id number]

 

        2017-05-15 12:02:37    2017-05-15 12:02:37    Outpatient                    [facility]     [id number] General

## 2024-03-19 NOTE — ED STATDOCS - PATIENT PORTAL LINK FT
You can access the FollowMyHealth Patient Portal offered by [facility] by registering at the following website: [email or web address]. By joining [facility]’s FollowMyHealth portal, you will also be able to view your health information using other applications (apps) compatible with our system.

## 2024-03-19 NOTE — ED ADULT TRIAGE NOTE - CHIEF COMPLAINT QUOTE
Pt ambulatory to ED with c/o left flank pain and nausea x Thursday. Pt was seen at urgent care and sent to ED for possible kidney stones. PMH breast CX and anxiety. Pt took Advil, at 3pm with no relief. Pt denies chest pain, SOB, fevers, chills. Pt observed pacing during triage.

## 2024-03-19 NOTE — ED ADULT NURSE NOTE - OBJECTIVE STATEMENT
Adequate
pt. came in from home with c/o left flank pain for 5 days, seen at Urgent care today and advised to go to ED for possible kidney stones, took tylenol at 1500H, alert and oriented, safety maintained applied

## 2024-03-19 NOTE — ED STATDOCS - PROGRESS NOTE DETAILS
Faizan MOSS: Patient is nontoxic appearing, tolerating PO, no evidence of UTI, VSS, CT of the chest and abdomen shows no evidence of PE, PNA, PTX, abdominal pathology, or any evidence of pyelo. Pt with hx of bilateral oophorectomy. Patient provided with results. Currently no saddle anesthesia, no incontinence of urine or stool, ambulatory, follow up with primary, GI and Gyn provided. Strict return precautions provided.

## 2024-03-19 NOTE — ED STATDOCS - CARE PROVIDERS DIRECT ADDRESSES
[email or web address]

## 2024-03-19 NOTE — ED STATDOCS - CARE PROVIDER_API CALL
Yahir Coats  Gastroenterology  [address] [place] [address]  Phone: [phone]  Fax: [phone]  Follow Up Time:     Thais Martinez  Obstetrics and Gynecology  [address] [place] [address]  Phone: [phone]  Fax: [phone]  Follow Up Time:

## 2024-03-19 NOTE — ED STATDOCS - OBJECTIVE STATEMENT
49 y/o F with PMHx of breast CA s/p L mastectomy presents to the ED c/o left flank pain and dysuria since Thursday. Pt was seen at urgent care and sent to ED to r/o diverticulitis, pyelonephritis. Pt took Advil, at 3pm with no relief. Pt denies chest pain, SOB, fevers, chills. 49 y/o F with PMHx of breast CA s/p L mastectomy presents to the ED c/o left flank pain and dysuria since Thursday. Pt was seen at urgent care and sent to ED to r/o diverticulitis, pyelonephritis. Pt took Advil, at 3pm with no relief. Pt denies chest pain, SOB, fevers, chills. No skin rash. No trauma. No visual or focal neurological complaints.

## 2024-03-19 NOTE — ED STATDOCS - CLINICAL SUMMARY MEDICAL DECISION MAKING FREE TEXT BOX
Plan on CT, labs, pain management, reassess. Plan on CT, labs, pain management, reassess.    flank pain, r/o pulmonary or abdominal origin pathology, labs, CTs. Patient is nontoxic appearing, tolerating PO, no evidence of UTI, VSS, CT of the chest and abdomen shows no evidence of PE, PNA, PTX, abdominal pathology, or any evidence of pyelo. Pt with hx of bilateral oophorectomy. Patient provided with results. Currently no saddle anesthesia, no incontinence of urine or stool, a

## 2024-03-19 NOTE — ED STATDOCS - NS_ ATTENDINGSCRIBEDETAILS _ED_A_ED_FT
I Quintin Gloria MD saw and examined the patient. Scribe documented for me and under my supervision. I have modified the scribe's documentation where necessary to reflect my history, physical exam and other relevant documentations pertinent to the care of the patient.

## 2024-03-19 NOTE — ED STATDOCS - GASTROINTESTINAL, MLM
abdomen soft, non-tender, and non-distended. Bowel sounds present. abdomen soft, non-tender, and non-distended. Bowel sounds present. BS+ in all quadrants.

## 2024-03-19 NOTE — ED STATDOCS - NSICDXPASTSURGICALHX_GEN_ALL_CORE_FT
PAST SURGICAL HISTORY:  H/O mastectomy, left      to be further assessed, pt not following assessment due to lethargy

## 2024-03-19 NOTE — ED STATDOCS - MUSCULOSKELETAL, MLM
range of motion is not limited and L CVAT range of motion is not limited and L CVAT present. No saddle anesthesia. No nuchal rigidity. 5/5 strength on flexion and extension of all limbs.

## 2024-03-19 NOTE — ED STATDOCS - DIFFERENTIAL DIAGNOSIS
Differential Diagnosis flank pain, r/o pulmonary or abdominal origin pathology, labs, CTs. Patient is nontoxic appearing, tolerating PO, no evidence of UTI, VSS, CT of the chest and abdomen shows no evidence of PE, PNA, PTX, abdominal pathology, or any evidence of pyelo. Pt with hx of bilateral oophorectomy. Patient provided with results. Currently no saddle anesthesia, no incontinence of urine or stool, ambulatory, follow up with primary, GI and Gyn provided. Strict return precautions

## 2024-03-19 NOTE — ED STATDOCS - NSFOLLOWUPINSTRUCTIONS_ED_ALL_ED_FT
Please return to us immediately if your symptoms worsen or if you have any blood in urine or stool, fever, chills, or if any chest pain or frontal abdominal pain. Please note that I have provided you with the results of your labs and imaging including all incidental findings. If you have any concerns or if any emergencies please return to us immediately. You need to follow up with a gastroenterologist and a primary care provider. Please contact them as soon as possible to set up appointment. Their information is provided for you in the first page of the discharge.     While CAT scan or CT scan imaging is an ideal imaging in the emergent setting, at times it is not the most sensitive in diagnosis of all disease process. Please note that you have been provided written copies of the imaging and that you can access real time reports using the patient portal link. You can also have your primary care provider or your specialist, pull up the real time reports or review images. All CT type images are read by our radiology specialist and ER defers to their expertise on findings. As stated there are certain pathologies that can be missed even with the best attention to detail, due to the inherent limitation of the CAT scan imaging such as ulcers, small masses or tumors or even certain cancers. Also note that most if not all CAT scan imaging can find incidental findings. Incidental findings are reported findings that are not the main goal behind doing the CAT scan but are details that the radiologist sees that he feels the patient should know about. Generally most incidental findings are benign but NOT always. So please make sure you report all incidental findings to your primary care provider and your specialist, as they might need further imaging or testing to rule out certain conditions. You can potentially need other imaging such as MRI (magnetic resonance imaging), gastric endoscopy, colonoscopy, etc. Sometimes nothing needs to be done, but this is a decision to be made by the primary or specialist so please inform them of the findings.     For all other concerns return to us immediately.    ______________  Flank Pain, Adult  Flank pain is pain that is located on the side of the body between the upper abdomen and the spine. This area is called the flank. The pain may occur over a short period of time (acute), or it may be long-term or recurring (chronic). It may be mild or severe. Flank pain can be caused by many things, including:  Muscle soreness or injury.  Kidney infection, kidney stones, or kidney disease.  Stress.  A disease of the spine (vertebral disk disease).  A lung infection (pneumonia).  Fluid around the lungs (pulmonary edema).  A skin rash caused by the chickenpox virus (shingles).  Tumors that affect the back of the abdomen.  Gallbladder disease.  Follow these instructions at home:  A comparison of three sample cups showing dark yellow, yellow, and pale yellow urine.  Drink enough fluid to keep your urine pale yellow.  Rest as told by your health care provider.  Take over-the-counter and prescription medicines only as told by your health care provider.  Keep a journal to track what has caused your flank pain and what has made it feel better.  Keep all follow-up visits. This is important.  Contact a health care provider if:  Your pain is not controlled with medicine.  You have new symptoms.  Your pain gets worse.  Your symptoms last longer than 2–3 days.  You have trouble urinating or you are urinating very frequently.  Get help right away if:  You have trouble breathing or you are short of breath.  Your abdomen hurts or it is swollen or red.  You have nausea or vomiting.  You feel faint, or you faint.  You have blood in your urine.  You have flank pain and a fever.  These symptoms may represent a serious problem that is an emergency. Do not wait to see if the symptoms will go away. Get medical help right away. Call your local emergency services (911 in the [place] Do not drive yourself to the hospital.    Summary  Flank pain is pain that is located on the side of the body between the upper abdomen and the spine.  The pain may occur over a short period of time (acute), or it may be long-term or recurring (chronic). It may be mild or severe.  Flank pain can be caused by many things.  Contact your health care provider if your symptoms get worse or last longer than 2–3 days.  This information is not intended to replace advice given to you by your health care provider. Make sure you discuss any questions you have with your health care provider.

## 2024-03-19 NOTE — ED ADULT NURSE NOTE - SUICIDE SCREENING QUESTION 2
Grace Baig sent to Alisha Lawson LPN  Community message received and route to Dr. Krueger Staff.        No

## 2024-03-20 VITALS
TEMPERATURE: 98 F | DIASTOLIC BLOOD PRESSURE: 69 MMHG | RESPIRATION RATE: 19 BRPM | HEART RATE: 83 BPM | SYSTOLIC BLOOD PRESSURE: 131 MMHG | OXYGEN SATURATION: 100 %

## 2024-03-20 PROCEDURE — 71275 CT ANGIOGRAPHY CHEST: CPT | Mod: 26,MC

## 2024-03-20 RX ORDER — CYCLOBENZAPRINE HYDROCHLORIDE 10 MG/1
10 TABLET, FILM COATED ORAL ONCE
Refills: 0 | Status: COMPLETED | OUTPATIENT
Start: 2024-03-20 | End: 2024-03-20

## 2024-03-20 RX ORDER — KETOROLAC TROMETHAMINE 30 MG/ML
30 SYRINGE (ML) INJECTION ONCE
Refills: 0 | Status: DISCONTINUED | OUTPATIENT
Start: 2024-03-20 | End: 2024-03-20

## 2024-03-20 RX ADMIN — Medication 30 MILLIGRAM(S): at 01:43

## 2024-03-20 RX ADMIN — CYCLOBENZAPRINE HYDROCHLORIDE 10 MILLIGRAM(S): 10 TABLET, FILM COATED ORAL at 01:42

## 2024-03-21 LAB
CULTURE RESULTS: SIGNIFICANT CHANGE UP
SPECIMEN SOURCE: SIGNIFICANT CHANGE UP

## 2024-04-06 NOTE — ED STATDOCS - ENMT NEGATIVE STATEMENT, MLM
chlorhexidine/Adherence to aseptic technique: hand hygiene prior to donning barriers (gown, gloves), don cap and mask, sterile drape over patient
Ears: no ear pain and no hearing problems. Nose: no nasal congestion and no nasal drainage. Mouth/Throat: no dysphagia, no hoarseness and no throat pain. Neck: no lumps, no pain, no stiffness and no swollen glands.

## 2024-04-09 ENCOUNTER — APPOINTMENT (OUTPATIENT)
Dept: ORTHOPEDIC SURGERY | Facility: CLINIC | Age: 49
End: 2024-04-09
Payer: COMMERCIAL

## 2024-04-09 VITALS — HEIGHT: 69 IN | WEIGHT: 200 LBS | BODY MASS INDEX: 29.62 KG/M2

## 2024-04-09 DIAGNOSIS — M23.91 UNSPECIFIED INTERNAL DERANGEMENT OF RIGHT KNEE: ICD-10-CM

## 2024-04-09 PROCEDURE — 99204 OFFICE O/P NEW MOD 45 MIN: CPT | Mod: 25

## 2024-04-09 PROCEDURE — 20610 DRAIN/INJ JOINT/BURSA W/O US: CPT | Mod: RT

## 2024-04-09 PROCEDURE — J3490M: CUSTOM

## 2024-04-09 PROCEDURE — 73564 X-RAY EXAM KNEE 4 OR MORE: CPT | Mod: RT

## 2024-04-09 NOTE — ASSESSMENT
[FreeTextEntry1] : Right X-Ray Examination of the KNEE (4 views): there are no fractures, subluxations or dislocations. mild deg changes  Due to the patients mechanical symptoms along with medial joint line pain, effusion, and pos natacha test on exam we will get an mri to eval for medial meniscus tear or occult fx  - The patient was advised of the diagnosis.  The natural history of the pathology was explained to the patient in layman's terms.  Several different treatment options were discussed and explained including the risks and benefits of both surgical and non-surgical treatments. - The patient was advised to apply ice (wrapped in a towel or protective covering) to the area daily (20 minutes at a time, 2-4X/day). - We also discussed the possible of a corticosteroid injection in order to help decrease inflammation and pain so that they can perform better therapy and they wished to proceed with this treatment course. - The patient was advised to modify their activities. - f/u after mri

## 2024-04-09 NOTE — IMAGING
[de-identified] :  RIGHT KNEE Inspection:  mild effusion Palpation: medial joint line tenderness  Knee Range of Motion:  0-130  Strength: 5/5 Quadriceps strength, 5/5 Hamstring strength Neurological: light touch is intact throughout Ligament Stability and Special Tests:  McMurrays: Positive Lachman: neg Pivot Shift: neg Posterior Drawer: neg Valgus: neg Varus: neg Patella Apprehension: neg Patella Maltracking: neg

## 2024-04-09 NOTE — HISTORY OF PRESENT ILLNESS
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 fell going up the step and knee twisted and buckled The pain is located  anteior, medial and deep The pain is associated with  swelling, clicking, buckling, catching Worse with walking activity and better at rest. Has tried wearing a knee sleeve, icing and Advil

## 2024-04-10 ENCOUNTER — APPOINTMENT (OUTPATIENT)
Dept: ORTHOPEDIC SURGERY | Facility: CLINIC | Age: 49
End: 2024-04-10

## 2024-04-16 ENCOUNTER — APPOINTMENT (OUTPATIENT)
Dept: MRI IMAGING | Facility: CLINIC | Age: 49
End: 2024-04-16
Payer: COMMERCIAL

## 2024-04-16 PROCEDURE — 73721 MRI JNT OF LWR EXTRE W/O DYE: CPT | Mod: RT

## 2024-04-18 ENCOUNTER — TRANSCRIPTION ENCOUNTER (OUTPATIENT)
Age: 49
End: 2024-04-18

## 2024-04-21 ENCOUNTER — NON-APPOINTMENT (OUTPATIENT)
Age: 49
End: 2024-04-21

## 2024-04-22 ENCOUNTER — APPOINTMENT (OUTPATIENT)
Dept: ORTHOPEDIC SURGERY | Facility: CLINIC | Age: 49
End: 2024-04-22
Payer: COMMERCIAL

## 2024-04-22 PROCEDURE — 27520 TREAT KNEECAP FRACTURE: CPT

## 2024-04-22 PROCEDURE — 99214 OFFICE O/P EST MOD 30 MIN: CPT | Mod: 25

## 2024-04-22 NOTE — ASSESSMENT
[FreeTextEntry1] : mri right knee 4/16/24 - nondisplaced intrameduallry fx patella, nondepressed subchondral fx LFC    - The patient was advised of the diagnosis.  The natural history of the pathology was explained to the patient in layman's terms.  Several different treatment options were discussed and explained including the risks and benefits of both surgical and non-surgical treatments.  All questions and concerns were answered. - nonop fx care in brace locked in ext - The patient was advised to modify their activities. - The patient was advised to apply ice (wrapped in a towel or protective covering) to the area daily (20 minutes at a time, 2-4X/day). - fu 6 week re-eval if tender and xray 2 view patella for healing and consider advance actiivity

## 2024-04-22 NOTE — IMAGING
[de-identified] : RIGHT KNEE Inspection:  mild effusion Palpation: medial facet of patella tenderness  Knee Range of Motion:  0-130  Strength: 5/5 Quadriceps strength, 5/5 Hamstring strength Neurological: light touch is intact throughout Ligament Stability and Special Tests:  Can SLR McMurrays: Positive Lachman: neg Pivot Shift: neg Posterior Drawer: neg Valgus: neg Varus: neg Patella Apprehension: neg Patella Maltracking: neg

## 2024-04-22 NOTE — HISTORY OF PRESENT ILLNESS
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 fell going up the step and knee twisted and buckled The pain is located  anteior, medial and deep The pain is associated with  swelling, clicking, buckling, catching Worse with walking activity and better at rest. Has tried wearing a knee sleeve, icing and Advil  4/22/24 - had mri showing occult fx patell and LFC, cont pain, mod activiyt

## 2024-04-29 ENCOUNTER — TRANSCRIPTION ENCOUNTER (OUTPATIENT)
Age: 49
End: 2024-04-29

## 2024-06-04 ENCOUNTER — APPOINTMENT (OUTPATIENT)
Dept: ORTHOPEDIC SURGERY | Facility: CLINIC | Age: 49
End: 2024-06-04

## 2024-06-13 ENCOUNTER — APPOINTMENT (OUTPATIENT)
Dept: ORTHOPEDIC SURGERY | Facility: CLINIC | Age: 49
End: 2024-06-13
Payer: COMMERCIAL

## 2024-06-13 ENCOUNTER — NON-APPOINTMENT (OUTPATIENT)
Age: 49
End: 2024-06-13

## 2024-06-13 DIAGNOSIS — M17.11 UNILATERAL PRIMARY OSTEOARTHRITIS, RIGHT KNEE: ICD-10-CM

## 2024-06-13 DIAGNOSIS — S82.001A UNSPECIFIED FRACTURE OF RIGHT PATELLA, INITIAL ENCOUNTER FOR CLOSED FRACTURE: ICD-10-CM

## 2024-06-13 PROCEDURE — 99024 POSTOP FOLLOW-UP VISIT: CPT

## 2024-06-13 PROCEDURE — 73560 X-RAY EXAM OF KNEE 1 OR 2: CPT | Mod: RT

## 2024-06-13 NOTE — IMAGING
[de-identified] : RIGHT KNEE Inspection:  mild effusion Palpation: medial facet of patella tenderness  Knee Range of Motion:  0-130  Strength: 5/5 Quadriceps strength, 5/5 Hamstring strength Neurological: light touch is intact throughout Ligament Stability and Special Tests:  Can SLR McMurrays: Positive Lachman: neg Pivot Shift: neg Posterior Drawer: neg Valgus: neg Varus: neg Patella Apprehension: neg Patella Maltracking: neg

## 2024-06-13 NOTE — ASSESSMENT
[FreeTextEntry1] : mri right knee 4/16/24 - nondisplaced intrameduallry fx patella, nondepressed subchondral fx LFC  xray right knee (2 views) healed patella fx, some deg changes   - The patient was advised of the diagnosis.  The natural history of the pathology was explained to the patient in layman's terms.  Several different treatment options were discussed and explained including the risks and benefits of both surgical and non-surgical treatments.  All questions and concerns were answered. - we will continue conservative treatment with PT, icing, and anti-inflammatory medications. - The patient was provided with a prescription for Physical Therapy. - The patient was advised to apply ice (wrapped in a towel or protective covering) to the area daily (20 minutes at a time, 2-4X/day). - The patient was advised to let pain guide the gradual advancement of activities. - if cont achiness in future from deg changes we discussed poss injections

## 2024-06-13 NOTE — HISTORY OF PRESENT ILLNESS
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 fell going up the step and knee twisted and buckled The pain is located  anteior, medial and deep The pain is associated with  swelling, clicking, buckling, catching Worse with walking activity and better at rest. Has tried wearing a knee sleeve, icing and Advil  4/22/24 - had mri showing occult fx patella and LFC, cont pain, mod activiyt 6/13/24 - using brace and mod activity, pain improving

## 2024-08-20 ENCOUNTER — APPOINTMENT (OUTPATIENT)
Dept: ORTHOPEDIC SURGERY | Facility: CLINIC | Age: 49
End: 2024-08-20
Payer: COMMERCIAL

## 2024-08-20 VITALS — WEIGHT: 200 LBS | BODY MASS INDEX: 29.62 KG/M2 | HEIGHT: 69 IN

## 2024-08-20 DIAGNOSIS — M17.11 UNILATERAL PRIMARY OSTEOARTHRITIS, RIGHT KNEE: ICD-10-CM

## 2024-08-20 PROCEDURE — 20610 DRAIN/INJ JOINT/BURSA W/O US: CPT | Mod: RT

## 2024-08-20 PROCEDURE — J3490M: CUSTOM

## 2024-08-20 PROCEDURE — 99214 OFFICE O/P EST MOD 30 MIN: CPT | Mod: 25

## 2024-08-20 NOTE — ASSESSMENT
[FreeTextEntry1] : mri right knee 4/16/24 - nondisplaced intrameduallry fx patella, nondepressed subchondral fx LFC xray healed patella fx, some deg changes   - We discussed their diagnosis and treatment options at length including the risks and benefits of both surgical treatment with a knee replacement and non-surgical options. Surgical risks include but are not limited to pain, infection, bleeding, vascular injury, numbness, tingling, nerve damage. - Due to risks of surgery, they will continue conservative treatment with PT, icing, and anti-inflammatory medications - The patient was provided with a PT prescription to work on ROM, hip ER/abductors strengthening, quad/hamstring stretches and strengthening, and other exercises - The patient was advised to let pain guide the gradual advancement of activities. - We also discussed the possible of a corticosteroid injection in order to help decrease inflammation and pain so that they can perform better therapy. - The risks, benefits, and alternatives to corticosteroid injection were reviewed with the patient and they wished to proceed with this treatment course. - Follow up as needed in 6 weeks to re-evaluate, if no improvement we spoke about possibility of viscosupplementation injections    Medication Discussion: 1) We discussed a comprehensive treatment plan that included possible pharmaceutical management involving the use of prescription strength medications including but not limited to options such as oral Naprosyn 500mg BID, once daily Meloxicam 15 mg, or 500-650 mg Tylenol versus over the counter oral medications in addition to discussing possible topical prescription Pennsaid vs  Voltaren gel. 2) There is a moderate risk of morbidity with further treatment, especially from use of prescription strength medications and possible side effects of these medications which include but are not limited to upset stomach with oral medications, skin reactions to topical medications and GI/cardiac/renal issues with long term use. 3) I recommended that the patient follow-up with their medical physician if there are any significant potential issues with long term medication use such as interactions with current medications or with exacerbation of underlying medical comorbidities. 4) The benefits and risks associated with use of oral and / or topical prescription and over the counter anti-inflammatory medications were discussed with the patient. The patient voiced understanding of the risks including but not limited to bleeding, stroke, kidney dysfunction, heart disease, and were referred to the black box warning label for further information.

## 2024-08-20 NOTE — IMAGING
[de-identified] : RIGHT KNEE Inspection:  mild effusion Palpation: medial facet of patella tenderness  Knee Range of Motion:  0-130  Strength: 5/5 Quadriceps strength, 5/5 Hamstring strength Neurological: light touch is intact throughout Ligament Stability and Special Tests:  Can SLR McMurrays: Positive Lachman: neg Pivot Shift: neg Posterior Drawer: neg Valgus: neg Varus: neg Patella Apprehension: neg Patella Maltracking: neg

## 2024-08-20 NOTE — HISTORY OF PRESENT ILLNESS
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 fell going up the step and knee twisted and buckled The pain is located  anteior, medial and deep The pain is associated with  swelling, clicking, buckling, catching Worse with walking activity and better at rest. Has tried wearing a knee sleeve, icing and Advil  4/22/24 - had mri showing occult fx patella and LFC, cont pain, mod activiyt 6/13/24 - using brace and mod activity, pain improving 8/20/24 - In PT 1-2x/week at [facility]. Cont. knee pain with stairs

## 2025-09-17 ENCOUNTER — NON-APPOINTMENT (OUTPATIENT)
Age: 50
End: 2025-09-17

## 2025-09-18 ENCOUNTER — APPOINTMENT (OUTPATIENT)
Dept: GASTROENTEROLOGY | Facility: CLINIC | Age: 50
End: 2025-09-18

## 2025-09-18 VITALS
BODY MASS INDEX: 19.26 KG/M2 | SYSTOLIC BLOOD PRESSURE: 108 MMHG | DIASTOLIC BLOOD PRESSURE: 70 MMHG | WEIGHT: 130 LBS | HEIGHT: 69 IN

## 2025-09-18 DIAGNOSIS — K80.20 CALCULUS OF GALLBLADDER W/OUT CHOLECYSTITIS W/OUT OBSTRUCTION: ICD-10-CM

## 2025-09-18 LAB
ALBUMIN SERPL ELPH-MCNC: 4.7 G/DL
ALP BLD-CCNC: 105 U/L
ALT SERPL-CCNC: 63 U/L
ANION GAP SERPL CALC-SCNC: 16 MMOL/L
AST SERPL-CCNC: 36 U/L
BASOPHILS # BLD AUTO: 0.09 K/UL
BASOPHILS NFR BLD AUTO: 1.5 %
BILIRUB SERPL-MCNC: 0.2 MG/DL
BUN SERPL-MCNC: 10 MG/DL
CALCIUM SERPL-MCNC: 9.6 MG/DL
CHLORIDE SERPL-SCNC: 102 MMOL/L
CO2 SERPL-SCNC: 23 MMOL/L
CREAT SERPL-MCNC: 0.85 MG/DL
EGFRCR SERPLBLD CKD-EPI 2021: 83 ML/MIN/1.73M2
EOSINOPHIL # BLD AUTO: 0.35 K/UL
EOSINOPHIL NFR BLD AUTO: 5.6 %
FERRITIN SERPL-MCNC: 178 NG/ML
GLUCOSE SERPL-MCNC: 66 MG/DL
HCT VFR BLD CALC: 38.1 %
HGB BLD-MCNC: 11.9 G/DL
IMM GRANULOCYTES NFR BLD AUTO: 0.2 %
INR PPP: 0.99 RATIO
IRON SATN MFR SERPL: 17 %
IRON SERPL-MCNC: 55 UG/DL
LYMPHOCYTES # BLD AUTO: 1.78 K/UL
LYMPHOCYTES NFR BLD AUTO: 28.7 %
MAN DIFF?: NORMAL
MCHC RBC-ENTMCNC: 29 PG
MCHC RBC-ENTMCNC: 31.2 G/DL
MCV RBC AUTO: 92.9 FL
MONOCYTES # BLD AUTO: 0.59 K/UL
MONOCYTES NFR BLD AUTO: 9.5 %
NEUTROPHILS # BLD AUTO: 3.38 K/UL
NEUTROPHILS NFR BLD AUTO: 54.5 %
PLATELET # BLD AUTO: 327 K/UL
POTASSIUM SERPL-SCNC: 4.6 MMOL/L
PROT SERPL-MCNC: 7.1 G/DL
PT BLD: 11.5 SEC
RBC # BLD: 4.1 M/UL
RBC # FLD: 13.5 %
SODIUM SERPL-SCNC: 140 MMOL/L
TIBC SERPL-MCNC: 325 UG/DL
UIBC SERPL-MCNC: 269 UG/DL
WBC # FLD AUTO: 6.2 K/UL

## 2025-09-19 LAB
HAV IGM SER QL: NONREACTIVE
HBV CORE IGM SER QL: NONREACTIVE
HBV SURFACE AG SER QL: NONREACTIVE
HCV AB SER QL: NONREACTIVE
HCV S/CO RATIO: 0.05 S/CO